# Patient Record
Sex: FEMALE | Race: WHITE | NOT HISPANIC OR LATINO | Employment: OTHER | URBAN - METROPOLITAN AREA
[De-identification: names, ages, dates, MRNs, and addresses within clinical notes are randomized per-mention and may not be internally consistent; named-entity substitution may affect disease eponyms.]

---

## 2022-02-23 ENCOUNTER — TELEPHONE (OUTPATIENT)
Dept: UROLOGY | Facility: MEDICAL CENTER | Age: 87
End: 2022-02-23

## 2022-02-23 NOTE — TELEPHONE ENCOUNTER
Please Triage -   New Patient- Manjhonny Saba    What is the reason for the patients appointment? patient called stating she would like to set up an appointment for blood in urine  Imaging/Lab Results:      Do we accept the patient's insurance or is the patient Self-Pay? Provider & Plan: Bryon Perez Ace Scriver  Member ID#: Has the patient had any previous urologist(s)?  No        Have patient records been requested?in epic        Has the patient had any outside testing done?in [facility]       Does the patient have a personal history of cancer?no       Patient can be reached at :[phone]

## 2022-03-02 ENCOUNTER — HOSPITAL ENCOUNTER (OUTPATIENT)
Dept: RADIOLOGY | Facility: CLINIC | Age: 87
Discharge: HOME | End: 2022-03-02
Payer: MEDICARE

## 2022-03-02 VITALS — BODY MASS INDEX: 21.08 KG/M2 | WEIGHT: 126.5 LBS | HEIGHT: 65 IN

## 2022-03-02 DIAGNOSIS — N64.4 BREAST PAIN: ICD-10-CM

## 2022-03-02 PROCEDURE — 77066 DX MAMMO INCL CAD BI: CPT

## 2022-03-02 PROCEDURE — G0279 TOMOSYNTHESIS, MAMMO: HCPCS

## 2022-03-03 DIAGNOSIS — N64.4 MASTODYNIA: ICD-10-CM

## 2022-04-08 ENCOUNTER — HOSPITAL ENCOUNTER (OUTPATIENT)
Dept: CT IMAGING | Facility: HOSPITAL | Age: 87
Discharge: HOME | End: 2022-04-08
Payer: MEDICARE

## 2022-04-08 DIAGNOSIS — J96.11 CHRONIC RESPIRATORY FAILURE WITH HYPOXIA (HCC): ICD-10-CM

## 2022-04-08 DIAGNOSIS — R93.89 ABNORMAL FINDINGS ON DIAGNOSTIC IMAGING OF OTHER SPECIFIED BODY STRUCTURES: ICD-10-CM

## 2022-04-08 PROCEDURE — G1004 CDSM NDSC: HCPCS

## 2022-04-08 PROCEDURE — 71250 CT THORAX DX C-: CPT

## 2022-05-06 NOTE — TELEPHONE ENCOUNTER
Patient calling to cancel appt today at 6 with Sarah Murdock  Patient had an emergency and will call back to reschedule

## 2022-06-03 NOTE — TELEPHONE ENCOUNTER
NP- has symptoms of UTI since Tuesday  No blood in her urine, but has back pain  No burning, just frequency  I can't find any opening until [place] and she said she can't wait that long       Patient can be reached at [phone]

## 2022-06-03 NOTE — TELEPHONE ENCOUNTER
Called and spoke to pt I reviewed and triaged  Scheduled pt for June 16th at 2:30  Time date location confirmed  Last time pt showed up on the wrong day

## 2022-06-16 ENCOUNTER — OFFICE VISIT (OUTPATIENT)
Dept: UROLOGY | Facility: CLINIC | Age: 87
End: 2022-06-16
Payer: MEDICARE

## 2022-06-16 VITALS
WEIGHT: 122 LBS | DIASTOLIC BLOOD PRESSURE: 82 MMHG | BODY MASS INDEX: 20.3 KG/M2 | SYSTOLIC BLOOD PRESSURE: 122 MMHG | HEART RATE: 72 BPM

## 2022-06-16 DIAGNOSIS — R31.29 MICROSCOPIC HEMATURIA: Primary | ICD-10-CM

## 2022-06-16 DIAGNOSIS — N39.0 RECURRENT UTI: ICD-10-CM

## 2022-06-16 LAB — POST-VOID RESIDUAL VOLUME, ML POC: 42 ML

## 2022-06-16 PROCEDURE — 51798 US URINE CAPACITY MEASURE: CPT

## 2022-06-16 PROCEDURE — 99204 OFFICE O/P NEW MOD 45 MIN: CPT

## 2022-06-16 RX ORDER — ACETAMINOPHEN 500 MG
1 TABLET ORAL DAILY
COMMUNITY

## 2022-06-16 RX ORDER — METOPROLOL SUCCINATE 50 MG/1
50 TABLET, EXTENDED RELEASE ORAL EVERY MORNING
COMMUNITY
Start: 2022-04-25

## 2022-06-16 RX ORDER — ALBUTEROL SULFATE 90 UG/1
AEROSOL, METERED RESPIRATORY (INHALATION)
COMMUNITY
Start: 2022-04-14

## 2022-06-16 RX ORDER — LEVOTHYROXINE SODIUM 100 MCG
TABLET ORAL
COMMUNITY
Start: 2022-03-18

## 2022-06-16 RX ORDER — UMECLIDINIUM BROMIDE AND VILANTEROL TRIFENATATE 62.5; 25 UG/1; UG/1
POWDER RESPIRATORY (INHALATION)
COMMUNITY
Start: 2022-04-04

## 2022-06-16 NOTE — PROGRESS NOTES
6/16/2022    Chief Complaint   Patient presents with    Urinary Tract Infection    Microhematuria       Assessment and Plan    82 y o  female new patient to office    1  Microscopic hematuria  · Urinalysis from 02/17/2022 showed moderate blood negative leukocytes or nitrates  · Unable to review urine culture results from that visit  · She is unable to provide urine sample in the office today  · She does have a positive smoking history, denies gross hematuria  · Repeat urinalysis, culture, urine cytology    2  Recurrent UTIs  · Currently she denies any lower urinary tract symptoms  · She was unable to provide a urine sample in the office today  · Orders placed for urinalysis and culture      Discussion:     I discussed with patient that her symptoms in February were consistent with a possible urinary tract infection  However I am unable to review the urine culture results from that time as they are unavailable through [facility]  Her urinalysis from 02/17 was positive for moderate blood and negative for nitrates or leukocytes  Given her positive smoking history I recommend a repeat urinalysis, micro, and urine cytology to further evaluate  Currently she denies any lower urinary tract symptoms  I discussed the possible need for a cystoscopy or CT renal protocol in the future  She was adamant that she does not want a cystoscopy performed or a CT study with IV contrast   She is willing to undergo a renal ultrasound if necessary  As patient is unable to provide urine sample in office today orders have been placed for her to provide 1 as an outpatient setting  She will follow-up in 4-6 weeks to re-evaluate and discuss  Patient is agreeable to plan and verbalize understanding  History of Present Illness  Zoraida Tavares is a 82 y o  female here for evaluation of microscopic hematuria  She was seen by her PCP on 02/17 for complaints of urinary incontinence and frequency    Urinalysis from 02/17/2022 showed moderate blood, negative nitrates or Estrace  Urine culture is unable to be viewed through epic  She does reports that she was prescribed antibiotics by another provider a few days later which caused her symptoms of urinary incontinence and frequency to resolve  She presents today offering no complaints  She does does report a history of recurrent UTIs in the past   She would occasionally experience some urinary frequency and urgency but this is intermittent and is not overly bothersome to her  She does have a positive smoking history and has been told in the past that her urine always has small amounts of blood in it  She denies any prior cystoscopy or microscopic hematuria workup in the past   Currently she denies any fever, chills, gross hematuria, abdominal pain, or flank pain  Review of Systems   Constitutional: Negative for chills and fever  HENT: Negative for ear pain and sore throat  Eyes: Negative for pain and visual disturbance  Respiratory: Negative for cough and shortness of breath  Cardiovascular: Negative for chest pain and palpitations  Gastrointestinal: Negative for abdominal pain, constipation, diarrhea, nausea and vomiting  Genitourinary: Positive for frequency (intermittent)  Negative for decreased urine volume, difficulty urinating, dysuria, flank pain, hematuria, pelvic pain, urgency and vaginal pain  Musculoskeletal: Negative for arthralgias and back pain  Skin: Negative for color change and rash  Neurological: Negative for dizziness, seizures, syncope, weakness and numbness  All other systems reviewed and are negative  Vitals  Vitals:    06/16/22 1440   BP: 122/82   Pulse: 72   Weight: 55 3 kg (122 lb)       Physical Exam  Vitals reviewed  Constitutional:       General: She is not in acute distress  Appearance: Normal appearance  She is normal weight  She is not ill-appearing or toxic-appearing     HENT:      Head: Normocephalic and atraumatic  Nose: Nose normal    Eyes:      General: No scleral icterus  Conjunctiva/sclera: Conjunctivae normal    Cardiovascular:      Rate and Rhythm: Normal rate  Pulses: Normal pulses  Pulmonary:      Effort: Pulmonary effort is normal  No respiratory distress  Abdominal:      General: Abdomen is flat  Palpations: Abdomen is soft  Tenderness: There is no abdominal tenderness  There is no right CVA tenderness or left CVA tenderness  Hernia: No hernia is present  Musculoskeletal:         General: Normal range of motion  Cervical back: Normal range of motion  Skin:     General: Skin is warm and dry  Neurological:      General: No focal deficit present  Mental Status: She is alert and oriented to person, place, and time  Mental status is at baseline  Psychiatric:         Mood and Affect: Mood normal          Behavior: Behavior normal          Thought Content:  Thought content normal          Judgment: Judgment normal          Past History  Past Medical History:   Diagnosis Date    Hypertension      Social History     Socioeconomic History    Marital status: /Civil Union     Spouse name: None    Number of children: None    Years of education: None    Highest education level: None   Occupational History    None   Tobacco Use    Smoking status: Former Smoker    Smokeless tobacco: Never Used   Vaping Use    Vaping Use: Never used   Substance and Sexual Activity    Alcohol use: Not Currently     Comment: very rare    Drug use: Not Currently    Sexual activity: None   Other Topics Concern    None   Social History Narrative    None     Social Determinants of Health     Financial Resource Strain: Not on file   Food Insecurity: Not on file   Transportation Needs: Not on file   Physical Activity: Not on file   Stress: Not on file   Social Connections: Not on file   Intimate Partner Violence: Not on file   Housing Stability: Not on file     Social History Tobacco Use   Smoking Status Former Smoker   Smokeless Tobacco Never Used     Family History   Problem Relation Age of Onset    Colon cancer Mother     No Known Problems Father     No Known Problems Sister     No Known Problems Maternal Grandmother     No Known Problems Maternal Grandfather     Breast cancer Paternal Grandmother     No Known Problems Paternal Grandfather     Breast cancer Paternal Aunt     Breast cancer Maternal Aunt 61    No Known Problems Maternal Aunt     No Known Problems Paternal Aunt     No Known Problems Paternal Aunt     No Known Problems Paternal Aunt     No Known Problems Son     BRCA2 Positive Neg Hx     BRCA2 Negative Neg Hx     BRCA1 Positive Neg Hx     BRCA1 Negative Neg Hx     BRCA 1/2 Neg Hx     Ovarian cancer Neg Hx     Endometrial cancer Neg Hx     Breast cancer additional onset Neg Hx        The following portions of the patient's history were reviewed and updated as appropriate allergies, current medications, past medical history, past social history, past surgical history and problem list    Imaging:    Results  Recent Results (from the past 1 hour(s))   POCT Measure PVR    Collection Time: 06/16/22  2:50 PM   Result Value Ref Range    POST-VOID RESIDUAL VOLUME, ML POC 42 mL   ]  No results found for: PSA  No results found for: GLUCOSE, CALCIUM, NA, K, CO2, CL, BUN, CREATININE  No results found for: WBC, HGB, HCT, MCV, PLT    Please Note:  Voice dictation software has been used to create this document  There may be inadvertent transcriptions errors       Kiah Polk

## 2022-06-27 ENCOUNTER — LAB (OUTPATIENT)
Dept: LAB | Facility: MEDICAL CENTER | Age: 87
End: 2022-06-27
Payer: MEDICARE

## 2022-06-27 DIAGNOSIS — R31.29 MICROSCOPIC HEMATURIA: ICD-10-CM

## 2022-06-27 DIAGNOSIS — N39.0 RECURRENT UTI: ICD-10-CM

## 2022-06-27 LAB
BACTERIA UR QL AUTO: ABNORMAL /HPF
BILIRUB UR QL STRIP: NEGATIVE
CLARITY UR: CLEAR
COLOR UR: YELLOW
GLUCOSE UR STRIP-MCNC: NEGATIVE MG/DL
HGB UR QL STRIP.AUTO: ABNORMAL
KETONES UR STRIP-MCNC: NEGATIVE MG/DL
LEUKOCYTE ESTERASE UR QL STRIP: ABNORMAL
MUCOUS THREADS UR QL AUTO: ABNORMAL
NITRITE UR QL STRIP: POSITIVE
NON-SQ EPI CELLS URNS QL MICRO: ABNORMAL /HPF
PH UR STRIP.AUTO: 6.5 [PH]
PROT UR STRIP-MCNC: NEGATIVE MG/DL
RBC #/AREA URNS AUTO: ABNORMAL /HPF
SP GR UR STRIP.AUTO: 1.02 (ref 1–1.03)
UROBILINOGEN UR STRIP-ACNC: <2 MG/DL
WBC #/AREA URNS AUTO: ABNORMAL /HPF

## 2022-06-27 PROCEDURE — 87077 CULTURE AEROBIC IDENTIFY: CPT

## 2022-06-27 PROCEDURE — 87186 SC STD MICRODIL/AGAR DIL: CPT

## 2022-06-27 PROCEDURE — 87086 URINE CULTURE/COLONY COUNT: CPT

## 2022-06-27 PROCEDURE — 81001 URINALYSIS AUTO W/SCOPE: CPT

## 2022-06-28 ENCOUNTER — LAB (OUTPATIENT)
Dept: LAB | Facility: MEDICAL CENTER | Age: 87
End: 2022-06-28
Payer: MEDICARE

## 2022-06-28 ENCOUNTER — TELEPHONE (OUTPATIENT)
Dept: UROLOGY | Facility: CLINIC | Age: 87
End: 2022-06-28

## 2022-06-28 DIAGNOSIS — R31.29 MICROSCOPIC HEMATURIA: Primary | ICD-10-CM

## 2022-06-28 DIAGNOSIS — N39.0 RECURRENT UTI: Primary | ICD-10-CM

## 2022-06-28 PROCEDURE — 88112 CYTOPATH CELL ENHANCE TECH: CPT | Performed by: PATHOLOGY

## 2022-06-28 RX ORDER — SULFAMETHOXAZOLE AND TRIMETHOPRIM 800; 160 MG/1; MG/1
1 TABLET ORAL EVERY 12 HOURS SCHEDULED
Qty: 6 TABLET | Refills: 0 | Status: SHIPPED | OUTPATIENT
Start: 2022-06-28 | End: 2022-07-01

## 2022-06-28 NOTE — TELEPHONE ENCOUNTER
Called and made patient aware of urinalysis results and that Bactrim was sent to her pharmacy  She is aware the office will contact her with final urine culture results if she needs to change antibiotics  Patient verbalized understanding

## 2022-06-28 NOTE — RESULT ENCOUNTER NOTE
Please let patient know her urine micro is positive for infection  I have sent a prescription to her pharmacy  Urine culture is pending

## 2022-06-30 LAB
BACTERIA UR CULT: ABNORMAL
BACTERIA UR CULT: ABNORMAL

## 2022-07-28 ENCOUNTER — OFFICE VISIT (OUTPATIENT)
Dept: UROLOGY | Facility: CLINIC | Age: 87
End: 2022-07-28
Payer: MEDICARE

## 2022-07-28 VITALS
WEIGHT: 124.6 LBS | HEART RATE: 70 BPM | BODY MASS INDEX: 20.73 KG/M2 | SYSTOLIC BLOOD PRESSURE: 126 MMHG | DIASTOLIC BLOOD PRESSURE: 80 MMHG

## 2022-07-28 DIAGNOSIS — N39.0 RECURRENT UTI: ICD-10-CM

## 2022-07-28 DIAGNOSIS — R31.29 MICROSCOPIC HEMATURIA: Primary | ICD-10-CM

## 2022-07-28 PROCEDURE — 99213 OFFICE O/P EST LOW 20 MIN: CPT

## 2022-07-28 NOTE — PROGRESS NOTES
7/28/2022    No chief complaint on file  Assessment and Plan    82 y o  female     1  Microscopic hematuria  · Likely in the setting of infection   · Urine culture positive on 6/30  · >100,000 cfu/mL Escherichia coli   · 40,000-49,000 cfu/ml Proteus vulgaris  · Urine cytology on 6/28 was negative for any high grade urothelial cells  · Denies episodes of gross hematuria  · Recommend follow-up in 6 months with repeat urine testing  2  Recurrent UTI  · Last positive urine culture 6/30  · >100,000 cfu/mL Escherichia coli   · 40,000-49,000 cfu/ml Proteus vulgaris  · Denies lower urinary tract symptoms today  · Recommend daily oral cranberry supplements and D-mannose  · Follow-up in 6 months      History of Present Illness  Racquel Petersen is a 82 y o  female here for follow-up evaluation of recurrent UTIs and microscopic hematuria initially seen by me on 6/16/2022 after being referred by her PCP  She was seen by her PCP on 02/17 for complaints of urinary incontinence and frequency  Urinalysis from 02/17/2022 showed moderate blood, negative nitrates or Estrace  Urine culture is unable to be viewed through epic  She does reports that she was prescribed antibiotics by another provider a few days later which caused her symptoms of urinary incontinence and frequency to resolve  During her last office visit she did reports a history of recurrent UTIs in the past  However given her smoking history I discussed possible need for further evaluation with a Renal US and repeat urine testing  Review of urinalysis from 6/27 showed large leukocytes, positive nitrates, and small blood  Urine culture was positive for >100,000 cfu/mL Escherichia coli and 40,000-49,000 cfu/ml Proteus vulgaris  She was treated with Bactrim at that time  Urine cytology from 6/28 was negative for high grade urothelial cells  She presents today offering no complaints   She denies any lower urinary tract symptoms, fever, chills, gross hematuria, abdominal pain, or flank pain  Review of Systems   Constitutional: Negative for chills and fever  HENT: Negative for ear pain and sore throat  Eyes: Negative for pain and visual disturbance  Respiratory: Negative for cough and shortness of breath  Cardiovascular: Negative for chest pain and palpitations  Gastrointestinal: Negative for abdominal pain, diarrhea, nausea and vomiting  Genitourinary: Negative for difficulty urinating, dysuria, flank pain, frequency, hematuria and urgency  Musculoskeletal: Negative for arthralgias and back pain  Skin: Negative for color change and rash  Neurological: Negative for dizziness, seizures, syncope and weakness  All other systems reviewed and are negative  Vitals  Vitals:    07/28/22 1426   BP: 126/80   Pulse: 70   Weight: 56 5 kg (124 lb 9 6 oz)       Physical Exam  Vitals reviewed  Constitutional:       General: She is not in acute distress  Appearance: Normal appearance  She is normal weight  She is not ill-appearing or toxic-appearing  HENT:      Head: Normocephalic and atraumatic  Nose: Nose normal    Eyes:      General: No scleral icterus  Conjunctiva/sclera: Conjunctivae normal    Cardiovascular:      Rate and Rhythm: Normal rate  Pulses: Normal pulses  Pulmonary:      Effort: Pulmonary effort is normal  No respiratory distress  Abdominal:      General: Abdomen is flat  Palpations: Abdomen is soft  Tenderness: There is no abdominal tenderness  There is no right CVA tenderness or left CVA tenderness  Hernia: No hernia is present  Musculoskeletal:         General: Normal range of motion  Cervical back: Normal range of motion  Skin:     General: Skin is warm and dry  Neurological:      General: No focal deficit present  Mental Status: She is alert and oriented to person, place, and time  Mental status is at baseline     Psychiatric:         Mood and Affect: Mood normal          Behavior: Behavior normal          Thought Content:  Thought content normal          Judgment: Judgment normal          Past History  Past Medical History:   Diagnosis Date    Hypertension      Social History     Socioeconomic History    Marital status: /Civil Union     Spouse name: None    Number of children: None    Years of education: None    Highest education level: None   Occupational History    None   Tobacco Use    Smoking status: Former Smoker    Smokeless tobacco: Never Used   Vaping Use    Vaping Use: Never used   Substance and Sexual Activity    Alcohol use: Not Currently     Comment: very rare    Drug use: Not Currently    Sexual activity: None   Other Topics Concern    None   Social History Narrative    None     Social Determinants of Health     Financial Resource Strain: Not on file   Food Insecurity: Not on file   Transportation Needs: Not on file   Physical Activity: Not on file   Stress: Not on file   Social Connections: Not on file   Intimate Partner Violence: Not on file   Housing Stability: Not on file     Social History     Tobacco Use   Smoking Status Former Smoker   Smokeless Tobacco Never Used     Family History   Problem Relation Age of Onset    Colon cancer Mother     No Known Problems Father     No Known Problems Sister     No Known Problems Maternal Grandmother     No Known Problems Maternal Grandfather     Breast cancer Paternal Grandmother     No Known Problems Paternal Grandfather     Breast cancer Paternal Aunt     Breast cancer Maternal Aunt 61    No Known Problems Maternal Aunt     No Known Problems Paternal Aunt     No Known Problems Paternal Aunt     No Known Problems Paternal Aunt     No Known Problems Son     BRCA2 Positive Neg Hx     BRCA2 Negative Neg Hx     BRCA1 Positive Neg Hx     BRCA1 Negative Neg Hx     BRCA 1/2 Neg Hx     Ovarian cancer Neg Hx     Endometrial cancer Neg Hx     Breast cancer additional onset Neg Hx        The following portions of the patient's history were reviewed and updated as appropriate allergies, current medications, past medical history, past social history, past surgical history and problem list    Imaging:    Results  No results found for this or any previous visit (from the past 1 hour(s))  ]  No results found for: PSA  No results found for: GLUCOSE, CALCIUM, NA, K, CO2, CL, BUN, CREATININE  No results found for: WBC, HGB, HCT, MCV, PLT    Please Note:  Voice dictation software has been used to create this document  There may be inadvertent transcriptions errors       Yaquelin Jean

## 2022-08-11 ENCOUNTER — HOSPITAL ENCOUNTER (OUTPATIENT)
Dept: CT IMAGING | Facility: HOSPITAL | Age: 87
Discharge: HOME | End: 2022-08-11
Payer: MEDICARE

## 2022-08-11 DIAGNOSIS — J44.9 COPD (CHRONIC OBSTRUCTIVE PULMONARY DISEASE) (HCC): ICD-10-CM

## 2022-08-11 PROCEDURE — G1004 CDSM NDSC: HCPCS

## 2022-08-11 PROCEDURE — 71250 CT THORAX DX C-: CPT

## 2023-01-30 ENCOUNTER — OFFICE VISIT (OUTPATIENT)
Dept: UROLOGY | Facility: CLINIC | Age: 88
End: 2023-01-30

## 2023-01-30 VITALS
SYSTOLIC BLOOD PRESSURE: 128 MMHG | DIASTOLIC BLOOD PRESSURE: 80 MMHG | BODY MASS INDEX: 20.63 KG/M2 | WEIGHT: 124 LBS | HEART RATE: 70 BPM

## 2023-01-30 DIAGNOSIS — R31.29 MICROSCOPIC HEMATURIA: Primary | ICD-10-CM

## 2023-01-30 DIAGNOSIS — N39.0 RECURRENT UTI: ICD-10-CM

## 2023-01-30 LAB
BACTERIA UR QL AUTO: NORMAL /HPF
BILIRUB UR QL STRIP: NEGATIVE
CLARITY UR: CLEAR
COLOR UR: NORMAL
GLUCOSE UR STRIP-MCNC: NEGATIVE MG/DL
HGB UR QL STRIP.AUTO: NEGATIVE
KETONES UR STRIP-MCNC: NEGATIVE MG/DL
LEUKOCYTE ESTERASE UR QL STRIP: NEGATIVE
NITRITE UR QL STRIP: NEGATIVE
NON-SQ EPI CELLS URNS QL MICRO: NORMAL /HPF
PH UR STRIP.AUTO: 6 [PH]
PROT UR STRIP-MCNC: NEGATIVE MG/DL
RBC #/AREA URNS AUTO: NORMAL /HPF
SP GR UR STRIP.AUTO: 1.01 (ref 1–1.03)
UROBILINOGEN UR STRIP-ACNC: <2 MG/DL
WBC #/AREA URNS AUTO: NORMAL /HPF

## 2023-01-30 RX ORDER — TIOTROPIUM BROMIDE 18 UG/1
CAPSULE ORAL; RESPIRATORY (INHALATION)
COMMUNITY
Start: 2022-12-08

## 2023-01-30 RX ORDER — LATANOPROST 50 UG/ML
SOLUTION/ DROPS OPHTHALMIC
COMMUNITY
Start: 2023-01-18

## 2023-01-30 RX ORDER — PANTOPRAZOLE SODIUM 20 MG/1
20 TABLET, DELAYED RELEASE ORAL EVERY MORNING
COMMUNITY
Start: 2022-11-28

## 2023-01-30 NOTE — PROGRESS NOTES
1/30/2023    No chief complaint on file  Assessment and Plan    82 y o  female     1  Microscopic hematuria  · Denies any episodes of gross hematuria  Microscopic hematuria is likely in the setting of Chronic cystitis   · Urine dip again shows trace blood today  · We will send for urine micro and culture  · If positive for true microscopic hematuria without infection I would recommend a Renal US and Cystoscopy  She is agreeable to have a Renal US done but is refusing a cystoscopy  · Follow-up 1 year pending urine testing results  2  Recurrent UTIs  · Doing very well since addition of oral cranberry and increasing water intake  · Denies any recurrent UTIs since last office visit  · Follow-up in 1 year or sooner as needed  Subjective:    She presents today reporting doing very well  She denies any issues with recurrent UTIs since her last office visit  She has been taking oral cranberry daily and increasing her water intake  She denies any gross hematuria  History of Present Illness  Orestes Ríos is a 82 y o  female here for 6-month follow-up evaluation of microscopic hematuria and recurrent UTIs  She was initially seen by me on 6/16/2022 after being referred by her PCP for evaluation of microscopic hematuria  Urinalysis from 2/17/2022 showed moderate blood, negative nitrates or esterase  During that time she did complain of urinary incontinence and frequency, and had been prescribed antibiotics by a different provider a few days after being seen by her PCP and reported resolution of her incontinence and frequency  She was unable to provide a urine sample during her initial office visit so urinalysis, culture and cytology were ordered for follow-up  She was seen in follow-up on 7/28/2022, and review of urinalysis from 6/27 showed large leukocytes, positive nitrates, and small blood   Urine culture was positive for >100,000 cfu/mL Escherichia coli and 40,000-49,000 cfu/ml Proteus vulgaris  She was treated with Bactrim at that time  Urine cytology from 6/28 was negative for high grade urothelial cells  She was started on oral cranberry supplements and d-mannose for UTI prevention and instructed to follow-up in 6 months  Review of Systems   Constitutional: Negative for chills and fever  HENT: Negative for ear pain and sore throat  Eyes: Negative for pain and visual disturbance  Respiratory: Negative for cough and shortness of breath  Cardiovascular: Negative for chest pain and palpitations  Gastrointestinal: Negative for abdominal pain, constipation, diarrhea, nausea and vomiting  Genitourinary: Negative for decreased urine volume, difficulty urinating, dysuria, flank pain, frequency, hematuria and urgency  Musculoskeletal: Negative for arthralgias and back pain  Skin: Negative for color change and rash  Neurological: Negative for dizziness, syncope, weakness and numbness  All other systems reviewed and are negative  Vitals  Vitals:    01/30/23 1142   BP: 128/80   Pulse: 70   Weight: 56 2 kg (124 lb)       Physical Exam  Vitals reviewed  Constitutional:       General: She is not in acute distress  Appearance: Normal appearance  She is normal weight  She is not ill-appearing or toxic-appearing  HENT:      Head: Normocephalic and atraumatic  Nose: Nose normal    Eyes:      General: No scleral icterus  Conjunctiva/sclera: Conjunctivae normal    Cardiovascular:      Rate and Rhythm: Normal rate  Pulses: Normal pulses  Pulmonary:      Effort: Pulmonary effort is normal  No respiratory distress  Abdominal:      General: Abdomen is flat  Palpations: Abdomen is soft  Tenderness: There is no abdominal tenderness  There is no right CVA tenderness or left CVA tenderness  Hernia: No hernia is present  Musculoskeletal:         General: Normal range of motion        Cervical back: Normal range of motion  Skin:     General: Skin is warm and dry  Neurological:      General: No focal deficit present  Mental Status: She is alert and oriented to person, place, and time  Mental status is at baseline  Psychiatric:         Mood and Affect: Mood normal          Behavior: Behavior normal          Thought Content:  Thought content normal          Judgment: Judgment normal          Past History  Past Medical History:   Diagnosis Date   • Hypertension      Social History     Socioeconomic History   • Marital status: /Civil Union     Spouse name: None   • Number of children: None   • Years of education: None   • Highest education level: None   Occupational History   • None   Tobacco Use   • Smoking status: Former   • Smokeless tobacco: Never   Vaping Use   • Vaping Use: Never used   Substance and Sexual Activity   • Alcohol use: Not Currently     Comment: very rare   • Drug use: Not Currently   • Sexual activity: None   Other Topics Concern   • None   Social History Narrative   • None     Social Determinants of Health     Financial Resource Strain: Not on file   Food Insecurity: Not on file   Transportation Needs: Not on file   Physical Activity: Not on file   Stress: Not on file   Social Connections: Not on file   Intimate Partner Violence: Not on file   Housing Stability: Not on file     Social History     Tobacco Use   Smoking Status Former   Smokeless Tobacco Never     Family History   Problem Relation Age of Onset   • Colon cancer Mother    • No Known Problems Father    • No Known Problems Sister    • No Known Problems Maternal Grandmother    • No Known Problems Maternal Grandfather    • Breast cancer Paternal Grandmother    • No Known Problems Paternal Grandfather    • Breast cancer Paternal Aunt    • Breast cancer Maternal Aunt 60   • No Known Problems Maternal Aunt    • No Known Problems Paternal Aunt    • No Known Problems Paternal Aunt    • No Known Problems Paternal Aunt    • No Known Problems Son    • BRCA2 Positive Neg Hx    • BRCA2 Negative Neg Hx    • BRCA1 Positive Neg Hx    • BRCA1 Negative Neg Hx    • BRCA 1/2 Neg Hx    • Ovarian cancer Neg Hx    • Endometrial cancer Neg Hx    • Breast cancer additional onset Neg Hx        The following portions of the patient's history were reviewed and updated as appropriate allergies, current medications, past medical history, past social history, past surgical history and problem list    Imaging:    Results  No results found for this or any previous visit (from the past 1 hour(s))  ]  No results found for: PSA  No results found for: GLUCOSE, CALCIUM, NA, K, CO2, CL, BUN, CREATININE  No results found for: WBC, HGB, HCT, MCV, PLT    Please Note:  Voice dictation software has been used to create this document  There may be inadvertent transcriptions errors       Ness Browne

## 2023-01-31 LAB
BACTERIA UR CULT: ABNORMAL
BACTERIA UR CULT: ABNORMAL

## 2023-02-17 ENCOUNTER — HOSPITAL ENCOUNTER (OUTPATIENT)
Dept: CT IMAGING | Facility: HOSPITAL | Age: 88
End: 2023-02-17

## 2023-02-17 DIAGNOSIS — R91.1 PULMONARY NODULE: ICD-10-CM

## 2023-02-27 ENCOUNTER — NURSE TRIAGE (OUTPATIENT)
Dept: OTHER | Facility: OTHER | Age: 88
End: 2023-02-27

## 2023-02-27 DIAGNOSIS — N39.0 RECURRENT UTI: Primary | ICD-10-CM

## 2023-02-27 NOTE — TELEPHONE ENCOUNTER
Regarding: UTI symptoms  ----- Message from Margarita Perez RN sent at 2/27/2023 12:15 PM EST -----  "They sent by urine out on 1/30/23 but I never heard anything back from them     I have had a UTI for the past 3 days, I dont feel like I have to go but it just comes out"

## 2023-02-27 NOTE — TELEPHONE ENCOUNTER
Reason for Disposition  • All other urine symptoms    Answer Assessment - Initial Assessment Questions  SYMPTOM: "What's the main symptom you're concerned about?" (e g , frequency, incontinence)      UTI sx   ONSET: "When did the UTI sx  start?"     3 days ago     CAUSE: "What do you think is causing the symptoms?"      UTI    Protocols used: URINARY SYMPTOMS-ADULT-[place]

## 2023-02-27 NOTE — TELEPHONE ENCOUNTER
Regarding: missed nurse's call  ----- Message from Wander Reynoso sent at 2/27/2023  4:39 PM EST -----  " I went to  my car and missed the call from the nurse   Can you please let her know that I am back home and to please call me to let me know what to do "

## 2023-02-27 NOTE — TELEPHONE ENCOUNTER
If she develops any fevers or chills I recommend she go to the hospital for evaluation  Is there someone that can take the sample to a lab for her?   But I agree with urine testing which should be done sooner than later

## 2023-02-27 NOTE — TELEPHONE ENCOUNTER
Pt had a urine cx done on 1/30/23  pt was asymptomatic at that time and no abx was ordered  Pt is now symptomatic with urinary burning and urinary frequency/incontinence  [facility] confirmed  Reason for Disposition  • Urinating more frequently than usual (i e , frequency)    Answer Assessment - Initial Assessment Questions  1  SYMPTOM: "What's the main symptom you're concerned about?" (e g , frequency, incontinence)        2  ONSET: "When did the    start?"     3 days  3  PAIN: "Is there any pain?" If Yes, ask: "How bad is it?" (Scale: 1-10; mild, moderate, severe)      denies  4  CAUSE: "What do you think is causing the symptoms?"     uti  5   OTHER SYMPTOMS: "Do you have any other symptoms?" (e g , fever, flank pain, blood in urine, pain with urination)   burning with urination, urinary frequency    Protocols used: URINARY SYMPTOMS-ADULT-OH

## 2023-02-27 NOTE — TELEPHONE ENCOUNTER
Patient states she missed call from urology RN  Patient made aware order for UA/culture is placed  She states she will go get the testing done tomorrow depending on the weather  Told to call back with worsening sx as she may need to be seen sooner  Patient in agreement

## 2023-03-02 NOTE — TELEPHONE ENCOUNTER
Called and left voicemail for patient to follow up on her symptoms, as urine testing has not yet been completed

## 2023-03-08 NOTE — TELEPHONE ENCOUNTER
Returned call to patient   Last seen in our office on 1/30/23 for microscopic Hematuria  Patient states she after appt she was not prescribed any antibiotics and has been feeling progressively worse  reports chills, does not have temp,   lower back pain b/l , kidneys and stomach intermittent   Feels unsteady started on Saturday  She was not able to get the blood work which was ordered , due to the weather  Advised patient due to reported symptoms and last recommendation per provider with worsening of symptoms to go to ed for evaluations  Patient verbalized understanding and agreement

## 2023-03-08 NOTE — TELEPHONE ENCOUNTER
Patient calling  She was not able to get to the lab last week to do the urine testing  patient is complaining of chills, no fever,  kidney pain on both sides it alternates sides, patient feels shakey  Please advise    [phone]

## 2023-03-08 NOTE — TELEPHONE ENCOUNTER
Agree with plan above  I reviewed urine cultures following her last office visit these were consistent with contaminants and did not require antibiotic treatment

## 2023-06-14 ENCOUNTER — HOSPITAL ENCOUNTER (OUTPATIENT)
Dept: RADIOLOGY | Facility: CLINIC | Age: 88
Discharge: HOME | End: 2023-06-14
Payer: MEDICARE

## 2023-06-14 VITALS — HEIGHT: 65 IN | BODY MASS INDEX: 20.66 KG/M2 | WEIGHT: 124 LBS

## 2023-06-14 DIAGNOSIS — Z12.31 ENCOUNTER FOR SCREENING MAMMOGRAM FOR MALIGNANT NEOPLASM OF BREAST: ICD-10-CM

## 2023-06-14 PROCEDURE — 77063 BREAST TOMOSYNTHESIS BI: CPT

## 2023-06-14 PROCEDURE — 77067 SCR MAMMO BI INCL CAD: CPT

## 2023-10-20 RX ORDER — TRIAMCINOLONE ACETONIDE 1 MG/G
CREAM TOPICAL
COMMUNITY
Start: 2023-05-31 | End: 2023-10-23 | Stop reason: ALTCHOICE

## 2023-10-23 ENCOUNTER — OFFICE VISIT (OUTPATIENT)
Dept: FAMILY MEDICINE CLINIC | Facility: CLINIC | Age: 88
End: 2023-10-23
Payer: MEDICARE

## 2023-10-23 VITALS
WEIGHT: 126 LBS | BODY MASS INDEX: 20.99 KG/M2 | HEART RATE: 97 BPM | DIASTOLIC BLOOD PRESSURE: 72 MMHG | SYSTOLIC BLOOD PRESSURE: 134 MMHG | OXYGEN SATURATION: 94 % | HEIGHT: 65 IN

## 2023-10-23 DIAGNOSIS — E43 SEVERE PROTEIN-CALORIE MALNUTRITION (HCC): ICD-10-CM

## 2023-10-23 DIAGNOSIS — J43.2 CENTRILOBULAR EMPHYSEMA (HCC): ICD-10-CM

## 2023-10-23 DIAGNOSIS — H25.89 OTHER AGE-RELATED CATARACT OF BOTH EYES: ICD-10-CM

## 2023-10-23 DIAGNOSIS — J96.11 CHRONIC RESPIRATORY FAILURE WITH HYPOXIA (HCC): ICD-10-CM

## 2023-10-23 DIAGNOSIS — E03.9 HYPOTHYROIDISM, UNSPECIFIED TYPE: Primary | ICD-10-CM

## 2023-10-23 PROCEDURE — 99214 OFFICE O/P EST MOD 30 MIN: CPT | Performed by: STUDENT IN AN ORGANIZED HEALTH CARE EDUCATION/TRAINING PROGRAM

## 2023-10-23 RX ORDER — BUDESONIDE AND FORMOTEROL FUMARATE DIHYDRATE 160; 4.5 UG/1; UG/1
2 AEROSOL RESPIRATORY (INHALATION) 2 TIMES DAILY
Qty: 10.2 G | Refills: 5 | Status: SHIPPED | OUTPATIENT
Start: 2023-10-23

## 2023-10-23 NOTE — PROGRESS NOTES
Name: Tiffanie Morales      : 1935      MRN: [id number]  Encounter Provider: Yared Prakash MD  Encounter Date: 10/23/2023   Encounter department: [address] [place] [address] PRIMARY CARE    Assessment & Plan     1. Hypothyroidism, unspecified type  Comments:  TSH . 28  currently on synth 100mcg  will get repeat  Orders:  -     TSH + Free T4; Future    2. Centrilobular emphysema (HCC)  Comments:  hx of 60pack years  currently 5-10 cig  endorses morning cough  only taking prn Albuterol   stop taking spiriva due to Thena Ryan  tee try symbicort  Orders:  -     Ambulatory Referral to Pulmonology; Future  -     budesonide-formoterol (SYMBICORT) 160-4.5 mcg/act inhaler; Inhale 2 puffs 2 (two) times a day Rinse mouth after use. 3. Other age-related cataract of both eyes  -     Ambulatory Referral to Ophthalmology; Future    4. Severe protein-calorie malnutrition ([address])    5. Chronic respiratory failure with hypoxia (HCC)        Depression Screening and Follow-up Plan: Patient was screened for depression during today's encounter. They screened negative with a PHQ-2 score of 0. Subjective     HPI    Pt is a 83 y/o F who presents to [facility] care. Pt has been doing well overall. Sees pulmonology in [place] but will like a provider nearby. She also has cataracts which requires surgery. She takes albuterol daily and did not take Spiriva or Anorro due to hx of glaucoma. She does have EDDY but denies any wheezing. Denies any HA,chest pain, abdominal pain, nausea, vomiting, fever or chills. PAST MEDICAL HISTORY:   Chronic obstructive pulmonary disease  Gastroesophageal reflux   Hiatal hernia  Hypothyroidism  Hypertension  Diastolic dysfunction    PAST SURGICAL HISTORY:  Total knee replacement R sided  Cholecystectomy, gallbladder  Thyroidectomy ()  Hysterectomy - complicated by hemorrhage       Review of Systems   Constitutional:  Negative for chills, fatigue and unexpected weight change.    HENT: Negative for congestion. Eyes:  Positive for visual disturbance. Respiratory:  Positive for cough. Negative for chest tightness, shortness of breath and wheezing. Cardiovascular:  Negative for chest pain, palpitations and leg swelling. Endocrine: Negative for polydipsia and polyuria. Genitourinary:  Negative for dysuria. Neurological:  Negative for weakness.        Past Medical History:   Diagnosis Date    Cataract     COPD (chronic obstructive pulmonary disease) ([address])     Hypertension      Past Surgical History:   Procedure Laterality Date    HYSTERECTOMY  06/1972    OOPHORECTOMY Left 06/1972     Family History   Problem Relation Age of Onset    Colon cancer Mother     No Known Problems Father     No Known Problems Sister     No Known Problems Maternal Grandmother     No Known Problems Maternal Grandfather     Breast cancer Paternal Grandmother         unknown age    No Known Problems Paternal Grandfather     No Known Problems Son     Breast cancer Maternal Aunt 60    No Known Problems Maternal Aunt     Breast cancer Paternal Aunt         unknown age    No Known Problems Paternal Aunt     No Known Problems Paternal Aunt     No Known Problems Paternal Aunt     BRCA2 Positive Neg Hx     BRCA2 Negative Neg Hx     BRCA1 Positive Neg Hx     BRCA1 Negative Neg Hx     BRCA 1/2 Neg Hx     Ovarian cancer Neg Hx     Endometrial cancer Neg Hx     Breast cancer additional onset Neg Hx      Social History     Socioeconomic History    Marital status: /Civil Union     Spouse name: None    Number of children: None    Years of education: None    Highest education level: None   Occupational History    None   Tobacco Use    Smoking status: Former    Smokeless tobacco: Never   Vaping Use    Vaping Use: Never used   Substance and Sexual Activity    Alcohol use: Not Currently     Comment: very rare    Drug use: Not Currently    Sexual activity: None   Other Topics Concern    None   Social History Narrative    None Social Determinants of Health     Financial Resource Strain: Not on file   Food Insecurity: Not on file   Transportation Needs: Not on file   Physical Activity: Not on file   Stress: Not on file   Social Connections: Not on file   Intimate Partner Violence: Not on file   Housing Stability: Not on file     Current Outpatient Medications on File Prior to Visit   Medication Sig    albuterol (PROVENTIL HFA,VENTOLIN HFA) 90 mcg/act inhaler inhale 2 puffs by mouth and INTO THE LUNGS every 4 hours    Calcium Carbonate-Vit D-Min (Calcium 1200) 6606-4474 MG-UNIT CHEW Chew 1 tablet daily    metoprolol succinate (TOPROL-XL) 50 mg 24 hr tablet Take 50 mg by mouth every morning    Synthroid 100 MCG tablet TAKE 1 TABLET BY MOUTH IN THE MORNING 30-60 MINUTES BEFORE EATING    Anoro Ellipta 62.5-25 MCG/INH inhaler inhale 1 puff by mouth and INTO THE LUNGS every morning (Patient not taking: Reported on 10/23/2023)    latanoprost (XALATAN) 0.005 % ophthalmic solution place 1 drop into both eyes every evening    [DISCONTINUED] pantoprazole (PROTONIX) 20 mg tablet Take 20 mg by mouth every morning (Patient not taking: Reported on 10/23/2023)    [DISCONTINUED] Spiriva HandiHaler 18 MCG inhalation capsule INHALE 1 CAPSULE ( 2 PUFFS ) BY MOUTH IN THE MORNING * DO NOT SWALLOW CAPSULE (Patient not taking: Reported on 10/23/2023)    [DISCONTINUED] triamcinolone (KENALOG) 0.1 % cream Apply to affected areas 2 times daily (Patient not taking: Reported on 10/23/2023)     No Known Allergies    There is no immunization history on file for this patient. Objective     /72   Pulse 97   Ht 5' 5" (1.651 m)   Wt 57.2 kg (126 lb)   SpO2 94%   BMI 20.97 kg/m²     Physical Exam  Constitutional:       General: She is not in acute distress. Appearance: She is not ill-appearing, toxic-appearing or diaphoretic. HENT:      Head: Normocephalic and atraumatic. Cardiovascular:      Rate and Rhythm: Normal rate and regular rhythm. Pulses: Normal pulses. Heart sounds: Normal heart sounds. No murmur heard. No friction rub. No gallop. Pulmonary:      Effort: Pulmonary effort is normal.      Breath sounds: Normal breath sounds. No wheezing, rhonchi or rales. Chest:      Chest wall: No tenderness. Abdominal:      General: There is no distension. Palpations: There is no mass. Tenderness: There is no abdominal tenderness. There is no right CVA tenderness, left CVA tenderness, guarding or rebound. Hernia: No hernia is present. Musculoskeletal:      Right lower leg: No edema. Left lower leg: No edema. Skin:     Findings: No lesion.    Neurological:      Gait: Gait normal.      Deep Tendon Reflexes: Reflexes normal.   Psychiatric:         Mood and Affect: Mood normal.         Behavior: Behavior normal.       Larissa Franco MD

## 2023-10-24 ENCOUNTER — APPOINTMENT (OUTPATIENT)
Dept: LAB | Facility: CLINIC | Age: 88
End: 2023-10-24
Payer: MEDICARE

## 2023-10-24 ENCOUNTER — TELEPHONE (OUTPATIENT)
Dept: ADMINISTRATIVE | Facility: OTHER | Age: 88
End: 2023-10-24

## 2023-10-24 DIAGNOSIS — E03.9 HYPOTHYROIDISM, UNSPECIFIED TYPE: ICD-10-CM

## 2023-10-24 LAB
T4 FREE SERPL-MCNC: 1.15 NG/DL (ref 0.61–1.12)
TSH SERPL DL<=0.05 MIU/L-ACNC: 1.11 UIU/ML (ref 0.45–4.5)

## 2023-10-24 PROCEDURE — 84443 ASSAY THYROID STIM HORMONE: CPT

## 2023-10-24 PROCEDURE — 36415 COLL VENOUS BLD VENIPUNCTURE: CPT

## 2023-10-24 PROCEDURE — 84439 ASSAY OF FREE THYROXINE: CPT

## 2023-10-24 NOTE — TELEPHONE ENCOUNTER
----- Message from Delmi Noel sent at 10/20/2023 12:48 PM EDT -----  Regarding: Care gap request  10/20/23 12:48 PM    Hello, our patient No patient name on file. has had Mammogram completed/performed. Please assist in updating the patient chart by pulling the Care Everywhere (CE) document. The date of service is 06/13/2023.      Thank you,  Delmi Noel   [address] [facility]

## 2023-10-24 NOTE — TELEPHONE ENCOUNTER
----- Message from Jim Rotmhan sent at 10/20/2023 12:50 PM EDT -----  Regarding: Care gap request  10/20/23 12:50 PM    Hello, our patient No patient name on file. has had DEXA Scan completed/performed. Please assist in updating the patient chart by pulling the Care Everywhere (CE) document. The date of service is 03/17/2021.      Thank you,  Jim Rothman   [address] [facility]

## 2023-10-25 NOTE — TELEPHONE ENCOUNTER
Upon review of the In Basket request we were able to locate, review, and update the patient chart as requested for DEXA Scan and Mammogram.    Any additional questions or concerns should be emailed to the Practice Liaisons via the appropriate education email address, please do not reply via In Basket.     Thank you  Prashant Martinez

## 2023-10-26 DIAGNOSIS — E03.9 HYPOTHYROIDISM, UNSPECIFIED TYPE: Primary | ICD-10-CM

## 2023-10-26 RX ORDER — LEVOTHYROXINE SODIUM 0.05 MG/1
50 TABLET ORAL
Qty: 90 TABLET | Refills: 3 | Status: SHIPPED | OUTPATIENT
Start: 2023-10-26

## 2023-11-01 ENCOUNTER — TELEPHONE (OUTPATIENT)
Dept: PULMONOLOGY | Facility: CLINIC | Age: 88
End: 2023-11-01

## 2023-11-01 NOTE — TELEPHONE ENCOUNTER
Callled and left message on patients machine to contact office as she was just seen by [facility] 9/14 and not sure if patient still needs this apt.

## 2023-11-07 ENCOUNTER — CONSULT (OUTPATIENT)
Dept: PULMONOLOGY | Facility: CLINIC | Age: 88
End: 2023-11-07

## 2023-11-07 VITALS
HEIGHT: 65 IN | DIASTOLIC BLOOD PRESSURE: 68 MMHG | SYSTOLIC BLOOD PRESSURE: 120 MMHG | OXYGEN SATURATION: 90 % | WEIGHT: 125 LBS | TEMPERATURE: 97.2 F | HEART RATE: 70 BPM | BODY MASS INDEX: 20.83 KG/M2

## 2023-11-07 DIAGNOSIS — J44.9 COPD, SEVERE (HCC): Primary | ICD-10-CM

## 2023-11-07 DIAGNOSIS — J43.2 CENTRILOBULAR EMPHYSEMA (HCC): ICD-10-CM

## 2023-11-07 DIAGNOSIS — R91.8 PULMONARY NODULES: ICD-10-CM

## 2023-11-07 DIAGNOSIS — J42 CHRONIC BRONCHITIS, UNSPECIFIED CHRONIC BRONCHITIS TYPE (HCC): ICD-10-CM

## 2023-11-07 RX ORDER — ALBUTEROL SULFATE 2.5 MG/3ML
2.5 SOLUTION RESPIRATORY (INHALATION) 2 TIMES DAILY
Qty: 180 ML | Refills: 6 | Status: SHIPPED | OUTPATIENT
Start: 2023-11-07 | End: 2023-12-07

## 2023-11-07 RX ORDER — ALBUTEROL SULFATE 2.5 MG/3ML
2.5 SOLUTION RESPIRATORY (INHALATION) EVERY 6 HOURS PRN
Qty: 180 ML | Refills: 6 | Status: SHIPPED | OUTPATIENT
Start: 2023-11-07 | End: 2023-11-07

## 2023-11-07 RX ORDER — SODIUM CHLORIDE FOR INHALATION 3 %
4 VIAL, NEBULIZER (ML) INHALATION 2 TIMES DAILY
Qty: 240 ML | Refills: 5 | Status: SHIPPED | OUTPATIENT
Start: 2023-11-07 | End: 2023-12-07

## 2023-11-07 NOTE — PROGRESS NOTES
Pulmonary Consultation   Felicitas Samson 82 y.o. female MRN: [id number]  11/7/2023      Assessment:  Severe COPD  Gold stage IIId last FEV1 of 36% in 2022  Seemed chronic bronchitis phenotype/moist cough noted in the office today  Minimal/clear sputum production  No history of frequent exacerbation or PRN use  Not on maintenance inhalers currently  Intermittent/infrequent wheezing in the day in addition to chest congestion    Plan:  Counseled about need for maintenance therapy, given the history of glaucoma does not want any drug interaction with the  Would avoid LAMA/Lea, discontinued Anoro Ellipta  Already has a prescription for the Symbicort, advised to use 2 puffs every 12 educated about the proper inhaler use technique  Added mucus clearing therapy: Hypertonic saline nebs/ordered a nebulizer with the supplies  Albuterol nebs at least twice daily    Active tobacco abuse  At least 60-pack-year, smokes 1 to 2 cigarettes intermittently  Counseled extensively    Abnormal CT chest  Per last imaging in 2/2023, seems to have progression/consolidation at the RLL, with more secretions around the same area suggesting pneumonitis/chronic bronchitis or mucus production  Considered a high risk given the 60-pack-year tobacco abuse history  Given the waxing and waning compared to prior imaging in 2019, with ongoing cough, currently not on treatment for chronic bronchitis    Plan:  Enhance mucus clearing: Albuterol/hypertonic saline nebs twice daily for at least 4 weeks  We will check CT chest in 4 weeks  Based on the residual findings we will discuss about PET/CT/vs biopsies if there is a suspicious lesions or bronchoscopy for airway exam    Return in about 6 weeks (around 12/19/2023).         History of Present Illness     New Consult for: COPD/abnormal CT chest       Background  82 y.o. female with a h/o hypothyroidism, malnutrition, COPD, tobacco abuse 60-pack-year, actively smokes 5 to 10 cigarettes/day, hiatal hernia, diastolic dysfunction    Seen by pulmonary and [facility] 2023-noted exertional hypoxemia SPO2 down to 86% on room air. Recommended PET/CT or biopsies for the prior CT chest findings. Presented today for initial evaluation by pulmonary at [facility] since it is closer to her home    Reports previous diagnosis of COPD few years ago, has been tried on different inhalers including Spiriva, then Anoro. Reports improving of intermittent wheezing with using the Anoro or the Spiriva. States that she has not been using inhalers on regularly basis before. Reports chronic bronchitis symptoms including moist cough, minimal sputum production that is mainly clear however still feels congested in her chest.  Describes a good days and bad days, seemed worse with extreme temperatures, always improves with rest.  Does not use PRN albuterol. Smoked for at least 60 years, 60-pack-year, intermittently smokes 1 to 2 cigarettes, lives alone independent in ADLs. No prior reported exacerbation/or frequent oral steroid use. Denies any unintentional weight loss, hemoptysis, anorexia, or significant bone pain. 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 PFT 2022 at [facility]-ratio 51%, FEV1 0.68 L / 36%, FVC 1.35 L / 53%, TLC 81%, %, DLCO 28%    Studies:  Imaging and other studies: I have personally reviewed pertinent films in PACS    CT chest 2019-posterior/medial RLL mucus impaction. Subsegmental atelectasis at the RLL/mild. CT chest 2023-RLL 3 mm nodule/stable. Linear atelectasis at the bases.   Nodular area seen on prior imaging in August obscured by consolidation. Right costophrenic angle consolidation increased. New adjacent tree-in-bud opacity near the terminus of the bronchi. Also new suspect inflammation/atelectasis. Waxing and waning consolidation since 2019. Bronchial wall thickening/secretion at the RLL. Pulmonary function testing:       EKG, Pathology, and Other Studies: I have personally reviewed pertinent reports. Historical Information   Past Medical History:   Diagnosis Date    Cataract     COPD (chronic obstructive pulmonary disease) ([address])     Hypertension      Past Surgical History:   Procedure Laterality Date    HYSTERECTOMY  06/1972    OOPHORECTOMY Left 06/1972     Family History   Problem Relation Age of Onset    Colon cancer Mother     No Known Problems Father     No Known Problems Sister     No Known Problems Maternal Grandmother     No Known Problems Maternal Grandfather     Breast cancer Paternal Grandmother         unknown age    No Known Problems Paternal Grandfather     No Known Problems Son     Breast cancer Maternal Aunt 61    No Known Problems Maternal Aunt     Breast cancer Paternal Aunt         unknown age    No Known Problems Paternal Aunt     No Known Problems Paternal Aunt     No Known Problems Paternal Aunt     BRCA2 Positive Neg Hx     BRCA2 Negative Neg Hx     BRCA1 Positive Neg Hx     BRCA1 Negative Neg Hx     BRCA 1/2 Neg Hx     Ovarian cancer Neg Hx     Endometrial cancer Neg Hx     Breast cancer additional onset Neg Hx          Medications/Allergies: Reviewed    Vitals: Blood pressure 120/68, pulse 70, temperature (!) 97.2 °F (36.2 °C), height 5' 5" (1.651 m), weight 56.7 kg (125 lb), SpO2 90 %. Body mass index is 20.8 kg/m². Oxygen Therapy  SpO2: 90 %      Physical Exam  Body mass index is 20.8 kg/m².    Gen: not in acute distress,   Neck/Eyes: supple, moderate to severe upper thoracic kyphosis, PERRL  Ear: normal appearance, mild but not significant hearing impairment  Nose:  normal nasal mucosa, no drainage  Mouth:  unremarkable/normal appearance of lips, teeth and gums  Oropharynx: mucosa is moist, no focal lesions or erythema  Salivary glands: soft nontender  Chest: normal respiratory efforts, diminished/poor air movement at the bases, clear sounds at the apices  CV: RRR, no murmurs appreciated, no edema  Abdomen: soft, non tender  Extremities:  No observed deformity, mild digital clubbing  Skin: unremarkable  Neuro: AAO X3, no focal motor deficit         Labs:  No results found for: "WBC", "HGB", "HCT", "MCV", "PLT"  No results found for: "GLUCOSE", "CALCIUM", "NA", "K", "CO2", "CL", "BUN", "CREATININE"  No results found for: "IGE"  No results found for: "ALT", "AST", "GGT", "ALKPHOS", "BILITOT"        Portions of the record may have been created with voice recognition software. Occasional wrong word or "sound a like" substitutions may have occurred due to the inherent limitations of voice recognition software. Read the chart carefully and recognize, using context, where substitutions have occurred    Edison Mullen M.D.   [facility]

## 2023-11-08 LAB
DME PARACHUTE DELIVERY DATE REQUESTED: NORMAL
DME PARACHUTE ITEM DESCRIPTION: NORMAL
DME PARACHUTE ORDER STATUS: NORMAL
DME PARACHUTE SUPPLIER NAME: NORMAL
DME PARACHUTE SUPPLIER PHONE: NORMAL

## 2023-12-13 ENCOUNTER — HOSPITAL ENCOUNTER (OUTPATIENT)
Dept: CT IMAGING | Facility: HOSPITAL | Age: 88
Discharge: HOME | End: 2023-12-13
Attending: INTERNAL MEDICINE
Payer: MEDICARE

## 2023-12-13 DIAGNOSIS — R91.8 PULMONARY NODULES: ICD-10-CM

## 2023-12-13 PROCEDURE — G1004 CDSM NDSC: HCPCS

## 2023-12-13 PROCEDURE — 71250 CT THORAX DX C-: CPT

## 2023-12-19 ENCOUNTER — OFFICE VISIT (OUTPATIENT)
Dept: PULMONOLOGY | Facility: CLINIC | Age: 88
End: 2023-12-19

## 2023-12-19 VITALS
SYSTOLIC BLOOD PRESSURE: 124 MMHG | BODY MASS INDEX: 21.33 KG/M2 | OXYGEN SATURATION: 92 % | HEART RATE: 62 BPM | TEMPERATURE: 97.6 F | WEIGHT: 128.2 LBS | DIASTOLIC BLOOD PRESSURE: 86 MMHG

## 2023-12-19 DIAGNOSIS — J44.9 COPD, SEVERE (HCC): Primary | ICD-10-CM

## 2023-12-19 DIAGNOSIS — J96.11 CHRONIC RESPIRATORY FAILURE WITH HYPOXIA (HCC): ICD-10-CM

## 2023-12-19 DIAGNOSIS — R91.8 PULMONARY NODULES: ICD-10-CM

## 2023-12-19 NOTE — PROGRESS NOTES
No Pulmonary Follow-Up Note   Lo House 88 y.o. female MRN: [id number]  12/19/2023      Assessment/Plan:    Diagnoses and all orders for this visit:    COPD, severe (HCC)    Chronic respiratory failure with hypoxia (HCC)    Pulmonary nodules      Vaccines: Does not do vaccines.    CT scan looks better compared prior scans - no evidence of new nodularity   Breathing - feels she is doing well overall with minimal symptoms. Feels the current plan of nebulizer treatments has kept her chest open and she is able to produce mucus secretions easily.  Continue Symbicort 160/4.5, 2 puffs AM and PM daily  Albuterol and saline nebulizer treatments: continue twice per day     Return in about 6 months (around 6/19/2024).    All of Lo's questions were answered prior to leaving the office today.  She is aware to call our office with any further questions or concerns.    History of Present Illness   Reason for Visit: Follow up  Chief Complaint: cough  HPI: Lo House is a 88 y.o. female who presents to the office today for follow up. At last visit she was asked to continue Symbicort twice daily as well as add hypertonic saline / albuterol nebs to clear chest.    She feels her cough has subsided with the addition of nebulizer treatment and has been less productive versus prior. She has no wheezing and minimal shortness of breath - mainly with exertion but it does not slow her down.    She is checking O2 levels at home and getting 90 or better.    Has not been sick since last visit. She is not sleeping well - has always been a light sleeper. Gets about 4-5 hours and does nap during the day.    Review of Systems   Constitutional:  Negative for chills, fatigue, fever and unexpected weight change.   Respiratory:  Positive for shortness of breath. Negative for cough, chest tightness and wheezing.    Cardiovascular:  Negative for chest pain, palpitations and leg swelling.   Gastrointestinal:  Negative for abdominal pain.   All  other systems reviewed and are negative.      Historical Information   Past Medical History:   Diagnosis Date    Cataract     COPD (chronic obstructive pulmonary disease) (HCC)     Hypertension      Past Surgical History:   Procedure Laterality Date    HYSTERECTOMY  06/1972    OOPHORECTOMY Left 06/1972     Family History   Problem Relation Age of Onset    Colon cancer Mother     No Known Problems Father     No Known Problems Sister     No Known Problems Maternal Grandmother     No Known Problems Maternal Grandfather     Breast cancer Paternal Grandmother         unknown age    No Known Problems Paternal Grandfather     No Known Problems Son     Breast cancer Maternal Aunt 60    No Known Problems Maternal Aunt     Breast cancer Paternal Aunt         unknown age    No Known Problems Paternal Aunt     No Known Problems Paternal Aunt     No Known Problems Paternal Aunt     BRCA2 Positive Neg Hx     BRCA2 Negative Neg Hx     BRCA1 Positive Neg Hx     BRCA1 Negative Neg Hx     BRCA 1/2 Neg Hx     Ovarian cancer Neg Hx     Endometrial cancer Neg Hx     Breast cancer additional onset Neg Hx      Social History   Social History     Substance and Sexual Activity   Alcohol Use Not Currently    Comment: very rare     Social History     Substance and Sexual Activity   Drug Use Not Currently     Social History     Tobacco Use   Smoking Status Some Days    Types: Cigarettes   Smokeless Tobacco Never     E-Cigarette/Vaping    E-Cigarette Use Never User      E-Cigarette/Vaping Substances    Nicotine No     THC No     CBD No     Flavoring No     Other No     Unknown No        Meds/Allergies     Current Outpatient Medications:     albuterol (PROVENTIL HFA,VENTOLIN HFA) 90 mcg/act inhaler, inhale 2 puffs by mouth and INTO THE LUNGS every 4 hours, Disp: , Rfl:     budesonide-formoterol (SYMBICORT) 160-4.5 mcg/act inhaler, Inhale 2 puffs 2 (two) times a day Rinse mouth after use., Disp: 10.2 g, Rfl: 5    Calcium Carbonate-Vit D-Min  (Calcium 1200) 4814-8397 MG-UNIT CHEW, Chew 1 tablet daily, Disp: , Rfl:     latanoprost (XALATAN) 0.005 % ophthalmic solution, place 1 drop into both eyes every evening, Disp: , Rfl:     levothyroxine (Euthyrox) 50 mcg tablet, Take 1 tablet (50 mcg total) by mouth daily in the early morning, Disp: 90 tablet, Rfl: 3    metoprolol succinate (TOPROL-XL) 50 mg 24 hr tablet, Take 50 mg by mouth every morning, Disp: , Rfl:   No Known Allergies    Vitals: Blood pressure 124/86, pulse 62, temperature 97.6 °F (36.4 °C), weight 58.2 kg (128 lb 3.2 oz), SpO2 92%. Body mass index is 21.33 kg/m². Oxygen Therapy  SpO2: 92 % room air      Physical Exam  Vitals reviewed.   Constitutional:       Appearance: Normal appearance. She is normal weight.   HENT:      Head: Normocephalic.      Nose: Nose normal.      Mouth/Throat:      Mouth: Mucous membranes are moist.      Pharynx: Oropharynx is clear.   Eyes:      Conjunctiva/sclera: Conjunctivae normal.      Pupils: Pupils are equal, round, and reactive to light.   Cardiovascular:      Rate and Rhythm: Normal rate and regular rhythm.      Pulses: Normal pulses.      Heart sounds: Normal heart sounds.   Pulmonary:      Effort: Pulmonary effort is normal.      Breath sounds: Normal breath sounds.   Abdominal:      General: Abdomen is flat. Bowel sounds are normal.      Palpations: Abdomen is soft.   Musculoskeletal:         General: Normal range of motion.   Skin:     General: Skin is warm.      Capillary Refill: Capillary refill takes less than 2 seconds.   Neurological:      General: No focal deficit present.      Mental Status: She is alert and oriented to person, place, and time. Mental status is at baseline.   Psychiatric:         Mood and Affect: Mood normal.         Behavior: Behavior normal.         Thought Content: Thought content normal.         Judgment: Judgment normal.         Labs: No new labs since last visit    Imaging and other studies: I have personally reviewed  "pertinent reports.   and I have personally reviewed pertinent films in PACS  CT chest wo contrast 12/13/23  IMPRESSION:     Right lower lobe dependent subsegmental atelectasis. No consolidative airspace opacities or suspicious pulmonary nodule identified.    Pulmonary Results (PFTs, PSG): I have personally reviewed pertinent reports.   and I have personally reviewed pertinent films in PACS  PFT 4/1/22:  FEV1/FVC 51%, with FEV1 0.68 L 36% predicted consistent with   severe obstructive ventilatory defect; no post bronchodilator response.   TLC 81%; %; DLCO 28% consistent with severe diffusion limitation.        DONAVAN Morelos  [facility]        Portions of the record may have been created with voice recognition software.  Occasional wrong word or \"sound a like\" substitutions may have occurred due to the inherent limitations of voice recognition software.  Read the chart carefully and recognize, using context, where substitutions have occurred or contact the dictating provider.  "

## 2024-02-21 ENCOUNTER — OFFICE VISIT (OUTPATIENT)
Dept: FAMILY MEDICINE CLINIC | Facility: CLINIC | Age: 89
End: 2024-02-21
Payer: MEDICARE

## 2024-02-21 VITALS
SYSTOLIC BLOOD PRESSURE: 120 MMHG | HEIGHT: 65 IN | WEIGHT: 128 LBS | BODY MASS INDEX: 21.33 KG/M2 | DIASTOLIC BLOOD PRESSURE: 76 MMHG

## 2024-02-21 DIAGNOSIS — J44.9 COPD, SEVERE (HCC): ICD-10-CM

## 2024-02-21 DIAGNOSIS — J42 CHRONIC BRONCHITIS, UNSPECIFIED CHRONIC BRONCHITIS TYPE (HCC): ICD-10-CM

## 2024-02-21 DIAGNOSIS — Z00.00 MEDICARE ANNUAL WELLNESS VISIT, SUBSEQUENT: ICD-10-CM

## 2024-02-21 DIAGNOSIS — B35.1 ONYCHOMYCOSIS: ICD-10-CM

## 2024-02-21 DIAGNOSIS — E03.9 ACQUIRED HYPOTHYROIDISM: Primary | ICD-10-CM

## 2024-02-21 DIAGNOSIS — I10 PRIMARY HYPERTENSION: ICD-10-CM

## 2024-02-21 DIAGNOSIS — J96.11 CHRONIC RESPIRATORY FAILURE WITH HYPOXIA (HCC): ICD-10-CM

## 2024-02-21 PROBLEM — E43 SEVERE PROTEIN-CALORIE MALNUTRITION (HCC): Status: RESOLVED | Noted: 2023-10-23 | Resolved: 2024-02-21

## 2024-02-21 PROCEDURE — G0439 PPPS, SUBSEQ VISIT: HCPCS | Performed by: FAMILY MEDICINE

## 2024-02-21 PROCEDURE — G0444 DEPRESSION SCREEN ANNUAL: HCPCS | Performed by: FAMILY MEDICINE

## 2024-02-21 PROCEDURE — 99214 OFFICE O/P EST MOD 30 MIN: CPT | Performed by: FAMILY MEDICINE

## 2024-02-21 RX ORDER — METOPROLOL SUCCINATE 50 MG/1
50 TABLET, EXTENDED RELEASE ORAL EVERY MORNING
Qty: 30 TABLET | Refills: 5 | Status: SHIPPED | OUTPATIENT
Start: 2024-02-21

## 2024-02-21 NOTE — ASSESSMENT & PLAN NOTE
At last visit had her thyroid supplement reduced to 50 mcg.  Should repeat the thyroid levels and I feel we will probably need to adjust

## 2024-02-21 NOTE — ASSESSMENT & PLAN NOTE
Reviewed recent pulmonology evaluation.  Try to avoid smoking and continue current aerosol therapy

## 2024-02-21 NOTE — PROGRESS NOTES
Assessment and Plan:     Problem List Items Addressed This Visit          Endocrine    Acquired hypothyroidism - Primary     At last visit had her thyroid supplement reduced to 50 mcg.  Should repeat the thyroid levels and I feel we will probably need to adjust         Relevant Medications    metoprolol succinate (TOPROL-XL) 50 mg 24 hr tablet    Other Relevant Orders    TSH, 3rd generation with Free T4 reflex       Respiratory    Chronic bronchitis, unspecified chronic bronchitis type (HCC)     Reviewed recent pulmonology evaluation.  Try to avoid smoking and continue current aerosol therapy         COPD, severe (HCC)     Seemingly improved.  Continue current regimen and follow-up         Chronic respiratory failure with hypoxia (HCC)       Cardiovascular and Mediastinum    Primary hypertension     Appears stable, continue current regimen         Relevant Medications    metoprolol succinate (TOPROL-XL) 50 mg 24 hr tablet       Musculoskeletal and Integument    Onychomycosis     Will make referral to podiatry for footcare         Relevant Orders    Ambulatory Referral to Podiatry     Other Visit Diagnoses       Medicare annual wellness visit, subsequent                Depression Screening and Follow-up Plan: Patient was screened for depression during today's encounter. They screened negative with a PHQ-2 score of 0.    Tobacco Cessation Counseling: Tobacco cessation counseling was provided. The patient is sincerely urged to quit consumption of tobacco. She is not ready to quit tobacco. Medication options not discussed.       Preventive health issues were discussed with patient, and age appropriate screening tests were ordered as noted in patient's After Visit Summary.  Personalized health advice and appropriate referrals for health education or preventive services given if needed, as noted in patient's After Visit Summary.     History of Present Illness:     Patient presents for a Medicare Wellness Visit    Patient  has history of hypertension, hypothyroidism, COPD and does follow-up with pulmonology.  Is having trouble with nail care on her feet       Patient Care Team:  Matt Overton MD as PCP - General (Family Medicine)     Review of Systems:     Review of Systems   Constitutional:  Negative for activity change, appetite change, chills, fatigue, fever and unexpected weight change.   HENT:  Negative for congestion, dental problem (Upper and lower dentures), hearing loss, rhinorrhea and trouble swallowing.    Eyes:  Negative for visual disturbance.   Respiratory:  Positive for cough (Chronic) and shortness of breath (This has improved). Negative for apnea and chest tightness.    Cardiovascular:  Negative for chest pain, palpitations and leg swelling.   Gastrointestinal:  Negative for abdominal distention, abdominal pain, constipation and diarrhea.   Endocrine: Negative for polyuria (Nocturia x 1).   Genitourinary:  Negative for enuresis.   Musculoskeletal:  Positive for myalgias. Negative for arthralgias.   Skin:  Negative for rash.   Allergic/Immunologic: Negative for environmental allergies.   Neurological:  Negative for dizziness, weakness, light-headedness, numbness and headaches.   Hematological:  Negative for adenopathy.   Psychiatric/Behavioral:  Negative for agitation, confusion and sleep disturbance.         Problem List:     Patient Active Problem List   Diagnosis    Chronic respiratory failure with hypoxia (HCC)    Chronic bronchitis, unspecified chronic bronchitis type (HCC)    COPD, severe (HCC)    Centrilobular emphysema (HCC)    Pulmonary nodules    Primary hypertension    Acquired hypothyroidism    Onychomycosis      Past Medical and Surgical History:     Past Medical History:   Diagnosis Date    Cataract     COPD (chronic obstructive pulmonary disease) (HCC)     Hypertension      Past Surgical History:   Procedure Laterality Date    HYSTERECTOMY  06/1972    OOPHORECTOMY Left 06/1972      Family  History:     Family History   Problem Relation Age of Onset    Colon cancer Mother     No Known Problems Father     No Known Problems Sister     No Known Problems Maternal Grandmother     No Known Problems Maternal Grandfather     Breast cancer Paternal Grandmother         unknown age    No Known Problems Paternal Grandfather     No Known Problems Son     Breast cancer Maternal Aunt 60    No Known Problems Maternal Aunt     Breast cancer Paternal Aunt         unknown age    No Known Problems Paternal Aunt     No Known Problems Paternal Aunt     No Known Problems Paternal Aunt     BRCA2 Positive Neg Hx     BRCA2 Negative Neg Hx     BRCA1 Positive Neg Hx     BRCA1 Negative Neg Hx     BRCA 1/2 Neg Hx     Ovarian cancer Neg Hx     Endometrial cancer Neg Hx     Breast cancer additional onset Neg Hx       Social History:     Social History     Socioeconomic History    Marital status: /Civil Union     Spouse name: None    Number of children: None    Years of education: None    Highest education level: None   Occupational History    None   Tobacco Use    Smoking status: Some Days     Types: Cigarettes    Smokeless tobacco: Never   Vaping Use    Vaping status: Never Used   Substance and Sexual Activity    Alcohol use: Not Currently     Comment: very rare    Drug use: Not Currently    Sexual activity: None   Other Topics Concern    None   Social History Narrative    None     Social Determinants of Health     Financial Resource Strain: Low Risk  (2/21/2024)    Overall Financial Resource Strain (CARDIA)     Difficulty of Paying Living Expenses: Not hard at all   Food Insecurity: No Food Insecurity (3/16/2023)    Received from [employer]    Hunger Vital Sign     Worried About Running Out of Food in the Last Year: Never true     Ran Out of Food in the Last Year: Never true   Transportation Needs: No Transportation Needs (2/21/2024)    PRAPARE - Transportation     Lack of Transportation (Medical): No     Lack of  Transportation (Non-Medical): No   Physical Activity: Not on file   Stress: Not on file   Social Connections: Not on file   Intimate Partner Violence: Not on file   Housing Stability: Not on file      Medications and Allergies:     Current Outpatient Medications   Medication Sig Dispense Refill    metoprolol succinate (TOPROL-XL) 50 mg 24 hr tablet Take 1 tablet (50 mg total) by mouth every morning 30 tablet 5    albuterol (PROVENTIL HFA,VENTOLIN HFA) 90 mcg/act inhaler inhale 2 puffs by mouth and INTO THE LUNGS every 4 hours      budesonide-formoterol (SYMBICORT) 160-4.5 mcg/act inhaler Inhale 2 puffs 2 (two) times a day Rinse mouth after use. 10.2 g 5    Calcium Carbonate-Vit D-Min (Calcium 1200) 6948-3536 MG-UNIT CHEW Chew 1 tablet daily      latanoprost (XALATAN) 0.005 % ophthalmic solution place 1 drop into both eyes every evening      levothyroxine (Euthyrox) 50 mcg tablet Take 1 tablet (50 mcg total) by mouth daily in the early morning 90 tablet 3     No current facility-administered medications for this visit.     No Known Allergies   Immunizations:       There is no immunization history on file for this patient.   Health Maintenance:     There are no preventive care reminders to display for this patient.      Topic Date Due    Pneumococcal Vaccine: 65+ Years (1 of 2 - PCV) Never done    Influenza Vaccine (1) Never done    COVID-19 Vaccine (1 - 2023-24 season) Never done      Medicare Screening Tests and Risk Assessments:     Lo is here for her Subsequent Wellness visit. Last Medicare Wellness visit information reviewed, patient interviewed and updates made to the record today.      Health Risk Assessment:   Patient rates overall health as good. Patient feels that their physical health rating is same. Patient is satisfied with their life. Eyesight was rated as same. Hearing was rated as same. Patient feels that their emotional and mental health rating is same. Patients states they are never, rarely  angry. Patient states they are never, rarely unusually tired/fatigued. Pain experienced in the last 7 days has been some. Patient's pain rating has been 2/10. Patient states that she has experienced no weight loss or gain in last 6 months. No issues    Depression Screening:   PHQ-2 Score: 0      Fall Risk Screening:   In the past year, patient has experienced: no history of falling in past year      Urinary Incontinence Screening:   Patient has not leaked urine accidently in the last six months. No issues    Home Safety:  Patient does not have trouble with stairs inside or outside of their home. Patient has working smoke alarms and has working carbon monoxide detector. Home safety hazards include: none. No issues    Nutrition:   Current diet is Regular. No issues    Medications:   Patient is currently taking over-the-counter supplements. OTC medications include: see medication list. Patient is able to manage medications. No issues    Activities of Daily Living (ADLs)/Instrumental Activities of Daily Living (IADLs):   Walk and transfer into and out of bed and chair?: Yes  Dress and groom yourself?: Yes    Bathe or shower yourself?: Yes    Feed yourself? Yes  Do your laundry/housekeeping?: Yes  Manage your money, pay your bills and track your expenses?: Yes  Make your own meals?: Yes    Do your own shopping?: Yes    ADL comments: Overall patient is very functional around the house.  No issues    Previous Hospitalizations:   Any hospitalizations or ED visits within the last 12 months?: No      Hospitalization Comments: Follows up with [facility] for pulmonology and urology    Advance Care Planning:   Living will: Yes    Durable POA for healthcare: Yes    Advanced directive: Yes    Advanced directive counseling given: Yes    Five wishes given: No    End of Life Decisions reviewed with patient: No      Comments: Should bring this into the office to have scanned into the chart    Cognitive Screening:   Provider  "or family/friend/caregiver concerned regarding cognition?: No    PREVENTIVE SCREENINGS      Cardiovascular Screening:    General: Screening Current      Diabetes Screening:     General: Screening Current      Colorectal Cancer Screening:     General: Screening Not Indicated      Breast Cancer Screening:     General: Screening Current      Cervical Cancer Screening:    General: Screening Not Indicated      Osteoporosis Screening:    General: Screening Current      Abdominal Aortic Aneurysm (AAA) Screening:        General: Screening Not Indicated      Lung Cancer Screening:     General: Screening Not Indicated      Hepatitis C Screening:    General: Screening Not Indicated      Preventive Screening Comments: Continue follow-up with eye care and have reports forwarded to office.  Will make referral to podiatry for footcare    Screening, Brief Intervention, and Referral to Treatment (SBIRT)    Screening  Typical number of drinks in a day: 0  Typical number of drinks in a week: 0  Interpretation: Low risk drinking behavior.    Single Item Drug Screening:  How often have you used an illegal drug (including marijuana) or a prescription medication for non-medical reasons in the past year? never    Single Item Drug Screen Score: 0  Interpretation: Negative screen for possible drug use disorder    Brief Intervention  Alcohol & drug use screenings were reviewed. No concerns regarding substance use disorder identified.     Annual Depression Screening  Time spent screening and evaluating the patient for depression during today's encounter was 5 minutes.    No results found.     Physical Exam:     Blood Pressure 120/76 (BP Location: Right arm, Patient Position: Sitting, Cuff Size: Standard)   Height 5' 5\" (1.651 m)   Weight 58.1 kg (128 lb)   Body Mass Index 21.30 kg/m²     Physical Exam  Vitals and nursing note reviewed.   Constitutional:       Appearance: Normal appearance. She is not ill-appearing.   HENT:      Head: " Normocephalic.      Right Ear: Tympanic membrane, ear canal and external ear normal. Decreased hearing (25 dB hearing screen is off.  No difficulty with conversation) noted.      Left Ear: Tympanic membrane, ear canal and external ear normal. Decreased hearing (25 dB hearing screen off at 4000 Hz) noted.      Nose: Nose normal.      Mouth/Throat:      Mouth: Mucous membranes are moist.      Dentition: Has dentures (Upper and lower dentures).   Eyes:      Extraocular Movements: Extraocular movements intact.      Conjunctiva/sclera: Conjunctivae normal.      Pupils: Pupils are equal, round, and reactive to light.   Neck:      Vascular: No carotid bruit.   Cardiovascular:      Rate and Rhythm: Normal rate and regular rhythm.      Pulses:           Dorsalis pedis pulses are 2+ on the right side and 2+ on the left side.        Posterior tibial pulses are 1+ on the right side and 1+ on the left side.      Heart sounds: Normal heart sounds. No murmur (Rate is 72) heard.  Pulmonary:      Effort: Pulmonary effort is normal.      Breath sounds: No wheezing, rhonchi or rales.   Abdominal:      General: Abdomen is flat. There is no distension.      Palpations: Abdomen is soft. There is no mass.      Tenderness: There is no abdominal tenderness.   Musculoskeletal:         General: No swelling.      Cervical back: Normal range of motion.      Right lower leg: No edema.      Left lower leg: No edema.      Right foot: No deformity.      Left foot: No deformity.   Feet:      Right foot:      Protective Sensation: 8 sites tested.  8 sites sensed.      Toenail Condition: Right toenails are abnormally thick and long.   Lymphadenopathy:      Cervical: No cervical adenopathy.   Skin:     General: Skin is warm and dry.      Capillary Refill: Capillary refill takes 2 to 3 seconds.      Findings: No rash.   Neurological:      General: No focal deficit present.      Mental Status: She is alert and oriented to person, place, and time.       Cranial Nerves: No cranial nerve deficit.      Sensory: No sensory deficit.      Motor: No weakness.      Coordination: Coordination normal.      Gait: Gait normal.      Deep Tendon Reflexes: Reflexes abnormal (Reflexes 1+).   Psychiatric:         Mood and Affect: Mood normal.         Behavior: Behavior normal.         Thought Content: Thought content normal.         Judgment: Judgment normal.          Matt Overton MD

## 2024-02-21 NOTE — PATIENT INSTRUCTIONS
Medicare Preventive Visit Patient Instructions  Thank you for completing your Welcome to Medicare Visit or Medicare Annual Wellness Visit today. Your next wellness visit will be due in one year (2/21/2025).  The screening/preventive services that you may require over the next 5-10 years are detailed below. Some tests may not apply to you based off risk factors and/or age. Screening tests ordered at today's visit but not completed yet may show as past due. Also, please note that scanned in results may not display below.  Preventive Screenings:  Service Recommendations Previous Testing/Comments   Colorectal Cancer Screening  * Colonoscopy    * Fecal Occult Blood Test (FOBT)/Fecal Immunochemical Test (FIT)  * Fecal DNA/Cologuard Test  * Flexible Sigmoidoscopy Age: 45-75 years old   Colonoscopy: every 10 years (may be performed more frequently if at higher risk)  OR  FOBT/FIT: every 1 year  OR  Cologuard: every 3 years  OR  Sigmoidoscopy: every 5 years  Screening may be recommended earlier than age 45 if at higher risk for colorectal cancer. Also, an individualized decision between you and your healthcare provider will decide whether screening between the ages of 76-85 would be appropriate. Colonoscopy: Not on file  FOBT/FIT: Not on file  Cologuard: Not on file  Sigmoidoscopy: Not on file    Screening Not Indicated     Breast Cancer Screening Age: 40+ years old  Frequency: every 1-2 years  Not required if history of left and right mastectomy Mammogram: 06/14/2023    Screening Current   Cervical Cancer Screening Between the ages of 21-29, pap smear recommended once every 3 years.   Between the ages of 30-65, can perform pap smear with HPV co-testing every 5 years.   Recommendations may differ for women with a history of total hysterectomy, cervical cancer, or abnormal pap smears in past. Pap Smear: Not on file    Screening Not Indicated   Hepatitis C Screening Once for adults born between 1945 and 1965  More frequently  in patients at high risk for Hepatitis C Hep C Antibody: Not on file        Diabetes Screening 1-2 times per year if you're at risk for diabetes or have pre-diabetes Fasting glucose: No results in last 5 years (No results in last 5 years)  A1C: No results in last 5 years (No results in last 5 years)  Screening Current   Cholesterol Screening Once every 5 years if you don't have a lipid disorder. May order more often based on risk factors. Lipid panel: 10/11/2021    Screening Current     Other Preventive Screenings Covered by Medicare:  Abdominal Aortic Aneurysm (AAA) Screening: covered once if your at risk. You're considered to be at risk if you have a family history of AAA.  Lung Cancer Screening: covers low dose CT scan once per year if you meet all of the following conditions: (1) Age 55-77; (2) No signs or symptoms of lung cancer; (3) Current smoker or have quit smoking within the last 15 years; (4) You have a tobacco smoking history of at least 20 pack years (packs per day multiplied by number of years you smoked); (5) You get a written order from a healthcare provider.  Glaucoma Screening: covered annually if you're considered high risk: (1) You have diabetes OR (2) Family history of glaucoma OR (3)  aged 50 and older OR (4)  American aged 65 and older  Osteoporosis Screening: covered every 2 years if you meet one of the following conditions: (1) You're estrogen deficient and at risk for osteoporosis based off medical history and other findings; (2) Have a vertebral abnormality; (3) On glucocorticoid therapy for more than 3 months; (4) Have primary hyperparathyroidism; (5) On osteoporosis medications and need to assess response to drug therapy.   Last bone density test (DXA Scan): 03/17/2021.  HIV Screening: covered annually if you're between the age of 15-65. Also covered annually if you are younger than 15 and older than 65 with risk factors for HIV infection. For pregnant patients,  it is covered up to 3 times per pregnancy.    Immunizations:  Immunization Recommendations   Influenza Vaccine Annual influenza vaccination during flu season is recommended for all persons aged >= 6 months who do not have contraindications   Pneumococcal Vaccine   * Pneumococcal conjugate vaccine = PCV13 (Prevnar 13), PCV15 (Vaxneuvance), PCV20 (Prevnar 20)  * Pneumococcal polysaccharide vaccine = PPSV23 (Pneumovax) Adults 19-65 yo with certain risk factors or if 65+ yo  If never received any pneumonia vaccine: recommend Prevnar 20 (PCV20)  Give PCV20 if previously received 1 dose of PCV13 or PPSV23   Hepatitis B Vaccine 3 dose series if at intermediate or high risk (ex: diabetes, end stage renal disease, liver disease)   Respiratory syncytial virus (RSV) Vaccine - COVERED BY MEDICARE PART D  * RSVPreF3 (Arexvy) CDC recommends that adults 60 years of age and older may receive a single dose of RSV vaccine using shared clinical decision-making (SCDM)   Tetanus (Td) Vaccine - COST NOT COVERED BY MEDICARE PART B Following completion of primary series, a booster dose should be given every 10 years to maintain immunity against tetanus. Td may also be given as tetanus wound prophylaxis.   Tdap Vaccine - COST NOT COVERED BY MEDICARE PART B Recommended at least once for all adults. For pregnant patients, recommended with each pregnancy.   Shingles Vaccine (Shingrix) - COST NOT COVERED BY MEDICARE PART B  2 shot series recommended in those 19 years and older who have or will have weakened immune systems or those 50 years and older     Health Maintenance Due:  There are no preventive care reminders to display for this patient.  Immunizations Due:      Topic Date Due    Pneumococcal Vaccine: 65+ Years (1 of 2 - PCV) Never done    Influenza Vaccine (1) Never done    COVID-19 Vaccine (1 - 2023-24 season) Never done     Advance Directives   What are advance directives?  Advance directives are legal documents that state your wishes  and plans for medical care. These plans are made ahead of time in case you lose your ability to make decisions for yourself. Advance directives can apply to any medical decision, such as the treatments you want, and if you want to donate organs.   What are the types of advance directives?  There are many types of advance directives, and each state has rules about how to use them. You may choose a combination of any of the following:  Living will:  This is a written record of the treatment you want. You can also choose which treatments you do not want, which to limit, and which to stop at a certain time. This includes surgery, medicine, IV fluid, and tube feedings.   Durable power of  for healthcare (DPAHC):  This is a written record that states who you want to make healthcare choices for you when you are unable to make them for yourself. This person, called a proxy, is usually a family member or a friend. You may choose more than 1 proxy.  Do not resuscitate (DNR) order:  A DNR order is used in case your heart stops beating or you stop breathing. It is a request not to have certain forms of treatment, such as CPR. A DNR order may be included in other types of advance directives.  Medical directive:  This covers the care that you want if you are in a coma, near death, or unable to make decisions for yourself. You can list the treatments you want for each condition. Treatment may include pain medicine, surgery, blood transfusions, dialysis, IV or tube feedings, and a ventilator (breathing machine).  Values history:  This document has questions about your views, beliefs, and how you feel and think about life. This information can help others choose the care that you would choose.  Why are advance directives important?  An advance directive helps you control your care. Although spoken wishes may be used, it is better to have your wishes written down. Spoken wishes can be misunderstood, or not followed.  Treatments may be given even if you do not want them. An advance directive may make it easier for your family to make difficult choices about your care.   Cigarette Smoking and Your Health   Risks to your health if you smoke:  Nicotine and other chemicals found in tobacco damage every cell in your body. Even if you are a light smoker, you have an increased risk for cancer, heart disease, and lung disease. If you are pregnant or have diabetes, smoking increases your risk for complications.   Benefits to your health if you stop smoking:   You decrease respiratory symptoms such as coughing, wheezing, and shortness of breath.   You reduce your risk for cancers of the lung, mouth, throat, kidney, bladder, pancreas, stomach, and cervix. If you already have cancer, you increase the benefits of chemotherapy. You also reduce your risk for cancer returning or a second cancer from developing.   You reduce your risk for heart disease, blood clots, heart attack, and stroke.   You reduce your risk for lung infections, and diseases such as pneumonia, asthma, chronic bronchitis, and emphysema.  Your circulation improves. More oxygen can be delivered to your body. If you have diabetes, you lower your risk for complications, such as kidney, artery, and eye diseases. You also lower your risk for nerve damage. Nerve damage can lead to amputations, poor vision, and blindness.  You improve your body's ability to heal and to fight infections.  For more information and support to stop smoking:   Smokefree.gov  Phone: 4- 292 - 706-3826  Web Address: www."[employer]     © Copyright [employer] 2018 Information is for End User's use only and may not be sold, redistributed or otherwise used for commercial purposes. All illustrations and images included in CareNotes® are the copyrighted property of [employer] Inc[employer] or [employer].  Overall patient seems very functional.  Does need to recheck the thyroid levels and I feel they will  probably need to be adjusted.  I did give refills for the metoprolol.  Continue follow-up with pulmonology and try to avoid smoking.  Try to walk as tolerated

## 2024-02-26 ENCOUNTER — LAB (OUTPATIENT)
Dept: LAB | Facility: CLINIC | Age: 89
End: 2024-02-26
Payer: MEDICARE

## 2024-02-26 DIAGNOSIS — E03.9 ACQUIRED HYPOTHYROIDISM: ICD-10-CM

## 2024-02-26 LAB — TSH SERPL DL<=0.05 MIU/L-ACNC: 0.97 UIU/ML (ref 0.45–4.5)

## 2024-02-26 PROCEDURE — 36415 COLL VENOUS BLD VENIPUNCTURE: CPT

## 2024-02-26 PROCEDURE — 84443 ASSAY THYROID STIM HORMONE: CPT

## 2024-05-16 ENCOUNTER — OFFICE VISIT (OUTPATIENT)
Dept: FAMILY MEDICINE CLINIC | Facility: CLINIC | Age: 89
End: 2024-05-16
Payer: MEDICARE

## 2024-05-16 VITALS
BODY MASS INDEX: 21.83 KG/M2 | SYSTOLIC BLOOD PRESSURE: 124 MMHG | DIASTOLIC BLOOD PRESSURE: 72 MMHG | WEIGHT: 131 LBS | HEIGHT: 65 IN

## 2024-05-16 DIAGNOSIS — I10 PRIMARY HYPERTENSION: ICD-10-CM

## 2024-05-16 DIAGNOSIS — J42 CHRONIC BRONCHITIS, UNSPECIFIED CHRONIC BRONCHITIS TYPE (HCC): ICD-10-CM

## 2024-05-16 DIAGNOSIS — E03.9 ACQUIRED HYPOTHYROIDISM: ICD-10-CM

## 2024-05-16 DIAGNOSIS — M79.622 LEFT UPPER ARM PAIN: Primary | ICD-10-CM

## 2024-05-16 PROCEDURE — 99214 OFFICE O/P EST MOD 30 MIN: CPT | Performed by: FAMILY MEDICINE

## 2024-05-16 PROCEDURE — G2211 COMPLEX E/M VISIT ADD ON: HCPCS | Performed by: FAMILY MEDICINE

## 2024-05-16 NOTE — PROGRESS NOTES
Ambulatory Visit  Name: Lo House      : 1935      MRN: [id number]  Encounter Provider: Matt Overton MD  Encounter Date: 2024   Encounter department: [facility] PRIMARY CARE    Assessment & Plan   1. Left upper arm pain  Assessment & Plan:  I feel this is muscular with irritation in the triceps area.  Does have weakness in the arms and needs to do mild exercise to strengthen the arms also needs to do more walking activity to strengthen the legs  2. Primary hypertension  Assessment & Plan:  Overall stable, continue current regimen  3. Chronic bronchitis, unspecified chronic bronchitis type (HCC)  Assessment & Plan:  Continues to have her cough.  Try to push overall activity as tolerated  4. Acquired hypothyroidism  Assessment & Plan:  Overall stable, continue current regimen         History of Present Illness         Patient has history of hypertension, hypothyroidism, COPD and does follow-up with pulmonology.  Has been having trouble with weakness particularly with her arms but has been very inactive.  Also has been having some trouble with pain in the left upper arm      Review of Systems   Constitutional:  Negative for activity change, appetite change, chills, fatigue, fever and unexpected weight change.   HENT:  Negative for congestion, rhinorrhea and trouble swallowing.    Eyes:  Negative for visual disturbance.   Respiratory:  Positive for cough. Negative for apnea, chest tightness and shortness of breath.    Cardiovascular:  Negative for chest pain, palpitations and leg swelling.   Gastrointestinal:  Negative for abdominal distention, abdominal pain, constipation and diarrhea.   Endocrine: Negative for polyuria (Nocturia x 1).   Genitourinary:  Positive for enuresis.   Musculoskeletal:  Positive for arthralgias (Pain in the left upper arm). Negative for myalgias.   Skin:  Negative for rash.   Allergic/Immunologic: Negative for environmental allergies.    Neurological:  Positive for weakness (Currently in the arms but also with walking). Negative for dizziness, light-headedness, numbness and headaches.   Hematological:  Negative for adenopathy. Does not bruise/bleed easily.   Psychiatric/Behavioral:  Negative for agitation, confusion and sleep disturbance.      Past Medical History:   Diagnosis Date    Cataract     COPD (chronic obstructive pulmonary disease) (HCC)     Hypertension      Past Surgical History:   Procedure Laterality Date    HYSTERECTOMY  06/1972    OOPHORECTOMY Left 06/1972     Family History   Problem Relation Age of Onset    Colon cancer Mother     No Known Problems Father     No Known Problems Sister     No Known Problems Maternal Grandmother     No Known Problems Maternal Grandfather     Breast cancer Paternal Grandmother         unknown age    No Known Problems Paternal Grandfather     No Known Problems Son     Breast cancer Maternal Aunt 60    No Known Problems Maternal Aunt     Breast cancer Paternal Aunt         unknown age    No Known Problems Paternal Aunt     No Known Problems Paternal Aunt     No Known Problems Paternal Aunt     BRCA2 Positive Neg Hx     BRCA2 Negative Neg Hx     BRCA1 Positive Neg Hx     BRCA1 Negative Neg Hx     BRCA 1/2 Neg Hx     Ovarian cancer Neg Hx     Endometrial cancer Neg Hx     Breast cancer additional onset Neg Hx      Social History     Tobacco Use    Smoking status: Some Days     Types: Cigarettes    Smokeless tobacco: Never   Vaping Use    Vaping status: Never Used   Substance and Sexual Activity    Alcohol use: Not Currently     Comment: very rare    Drug use: Not Currently    Sexual activity: Not on file     Current Outpatient Medications on File Prior to Visit   Medication Sig    albuterol (PROVENTIL HFA,VENTOLIN HFA) 90 mcg/act inhaler inhale 2 puffs by mouth and INTO THE LUNGS every 4 hours    budesonide-formoterol (SYMBICORT) 160-4.5 mcg/act inhaler Inhale 2 puffs 2 (two) times a day Rinse mouth  "after use.    Calcium Carbonate-Vit D-Min (Calcium 1200) 0337-1619 MG-UNIT CHEW Chew 1 tablet daily    latanoprost (XALATAN) 0.005 % ophthalmic solution place 1 drop into both eyes every evening    levothyroxine (Euthyrox) 50 mcg tablet Take 1 tablet (50 mcg total) by mouth daily in the early morning    metoprolol succinate (TOPROL-XL) 50 mg 24 hr tablet Take 1 tablet (50 mg total) by mouth every morning     No Known Allergies    There is no immunization history on file for this patient.  Objective     Blood Pressure 124/72 (BP Location: Left arm, Patient Position: Sitting, Cuff Size: Standard)   Height 5' 5\" (1.651 m)   Weight 59.4 kg (131 lb)   Body Mass Index 21.80 kg/m²     Physical Exam  Vitals and nursing note reviewed.   Constitutional:       Appearance: Normal appearance. She is not ill-appearing.   HENT:      Head: Normocephalic.      Right Ear: Tympanic membrane, ear canal and external ear normal.      Left Ear: Tympanic membrane, ear canal and external ear normal.      Nose: Nose normal.      Mouth/Throat:      Mouth: Mucous membranes are moist.   Eyes:      Conjunctiva/sclera: Conjunctivae normal.   Neck:      Vascular: No carotid bruit.   Cardiovascular:      Rate and Rhythm: Normal rate and regular rhythm.      Pulses: Normal pulses.      Heart sounds: Normal heart sounds. No murmur (Rate is 72) heard.  Pulmonary:      Effort: Pulmonary effort is normal.      Breath sounds: Normal breath sounds.   Abdominal:      General: Abdomen is flat. There is no distension.      Palpations: Abdomen is soft. There is no mass.      Tenderness: There is no abdominal tenderness.   Musculoskeletal:         General: No swelling.        Arms:       Cervical back: Normal range of motion. No tenderness.      Right lower leg: No edema.      Left lower leg: No edema.   Lymphadenopathy:      Cervical: No cervical adenopathy.   Skin:     General: Skin is warm and dry.      Findings: No rash.   Neurological:      Mental " Status: She is alert and oriented to person, place, and time.      Motor: Weakness (In the legs) present.      Coordination: Coordination normal.      Gait: Gait abnormal.      Deep Tendon Reflexes: Reflexes abnormal (Flexes 1+).   Psychiatric:         Mood and Affect: Mood normal.         Behavior: Behavior normal.         Thought Content: Thought content normal.         Judgment: Judgment normal.       Administrative Statements

## 2024-05-16 NOTE — PATIENT INSTRUCTIONS
Is having trouble with weakness and needs to get some exercises to strengthen the arms.  Should push walking activity every day to strengthen the legs.  Does have some soreness in the left upper arm which I feel is muscular may use heating pad for 20 minutes at a time.  Suggested trying Tylenol for the arthritis which may help the problem with the hands.  Will continue on all meds as is

## 2024-05-17 NOTE — ASSESSMENT & PLAN NOTE
I feel this is muscular with irritation in the triceps area.  Does have weakness in the arms and needs to do mild exercise to strengthen the arms also needs to do more walking activity to strengthen the legs

## 2024-08-09 ENCOUNTER — RA CDI HCC (OUTPATIENT)
Dept: OTHER | Facility: HOSPITAL | Age: 89
End: 2024-08-09

## 2024-08-16 ENCOUNTER — OFFICE VISIT (OUTPATIENT)
Dept: FAMILY MEDICINE CLINIC | Facility: CLINIC | Age: 89
End: 2024-08-16
Payer: MEDICARE

## 2024-08-16 VITALS
BODY MASS INDEX: 21.83 KG/M2 | WEIGHT: 131 LBS | SYSTOLIC BLOOD PRESSURE: 120 MMHG | HEIGHT: 65 IN | DIASTOLIC BLOOD PRESSURE: 76 MMHG

## 2024-08-16 DIAGNOSIS — J42 CHRONIC BRONCHITIS, UNSPECIFIED CHRONIC BRONCHITIS TYPE (HCC): ICD-10-CM

## 2024-08-16 DIAGNOSIS — Z12.31 BREAST CANCER SCREENING BY MAMMOGRAM: ICD-10-CM

## 2024-08-16 DIAGNOSIS — E03.9 ACQUIRED HYPOTHYROIDISM: Primary | ICD-10-CM

## 2024-08-16 DIAGNOSIS — I10 PRIMARY HYPERTENSION: ICD-10-CM

## 2024-08-16 DIAGNOSIS — R60.0 LOCALIZED EDEMA: ICD-10-CM

## 2024-08-16 PROCEDURE — G2211 COMPLEX E/M VISIT ADD ON: HCPCS | Performed by: FAMILY MEDICINE

## 2024-08-16 PROCEDURE — 99214 OFFICE O/P EST MOD 30 MIN: CPT | Performed by: FAMILY MEDICINE

## 2024-08-16 RX ORDER — METOPROLOL SUCCINATE 50 MG/1
50 TABLET, EXTENDED RELEASE ORAL EVERY MORNING
Qty: 30 TABLET | Refills: 5 | Status: SHIPPED | OUTPATIENT
Start: 2024-08-16

## 2024-08-16 NOTE — PATIENT INSTRUCTIONS
Is having trouble with swelling in both feet more on the left.  Try to prop the feet up when sitting.  Try to limit all fluids to 1-1/2 quarts a day.  Will check renal panel and recheck thyroid levels.  Is due for follow-up with pulmonology.  Try to get a little bit more walking activity

## 2024-08-16 NOTE — PROGRESS NOTES
Ambulatory Visit  Name: Lo House      : 1935      MRN: [id number]  Encounter Provider: Matt Overton MD  Encounter Date: 2024   Encounter department: [facility] PRIMARY CARE    Assessment & Plan   1. Acquired hypothyroidism  Assessment & Plan:  Will check thyroid levels and adjust meds accordingly  Orders:  -     TSH, 3rd generation with Free T4 reflex; Future  2. Primary hypertension  Assessment & Plan:  Overall stable.  Will check renal panel.  Continue current medication  Orders:  -     metoprolol succinate (TOPROL-XL) 50 mg 24 hr tablet; Take 1 tablet (50 mg total) by mouth every morning  -     Renal function panel; Future  3. Breast cancer screening by mammogram  -     Mammo breast specimen bilateral; Future  4. Localized edema  Assessment & Plan:  Discussed problem.  I feel part of this relates to excessive fluid intake and should limit all fluids to 1-1/2 quarts a day and elevate legs when sitting.  Will check renal panel and thyroid levels  5. Chronic bronchitis, unspecified chronic bronchitis type (HCC)  Assessment & Plan:  Has not been taking the Symbicort.  Is due for follow-up with pulmonology.  Does have some wheezing today.  Advised should probably restart the Symbicort         History of Present Illness           Patient has history of hypertension, hypothyroidism, COPD and does follow-up with pulmonology.  Has been having trouble with swelling in the left foot and is concerned this could relate to heart failure.  Does drink a lot of fluid during the day including 8 glasses of water.      Review of Systems   Constitutional:  Negative for chills and fever.   HENT:  Negative for congestion, sore throat and trouble swallowing.    Eyes:  Negative for pain and visual disturbance.   Respiratory:  Positive for cough. Negative for chest tightness and shortness of breath.    Cardiovascular:  Positive for leg swelling. Negative for chest pain and palpitations.    Gastrointestinal:  Negative for abdominal pain, constipation, diarrhea and vomiting.   Endocrine: Negative for polyuria.   Genitourinary:  Positive for enuresis.   Musculoskeletal:  Positive for arthralgias. Negative for back pain.   Skin:  Negative for color change and rash.   Allergic/Immunologic: Negative for environmental allergies.   Neurological:  Negative for dizziness, seizures, syncope and light-headedness.   Psychiatric/Behavioral:  Negative for sleep disturbance.    All other systems reviewed and are negative.    Past Medical History:   Diagnosis Date    Cataract     COPD (chronic obstructive pulmonary disease) (HCC)     Hypertension      Past Surgical History:   Procedure Laterality Date    HYSTERECTOMY  06/1972    OOPHORECTOMY Left 06/1972     Family History   Problem Relation Age of Onset    Colon cancer Mother     No Known Problems Father     No Known Problems Sister     No Known Problems Maternal Grandmother     No Known Problems Maternal Grandfather     Breast cancer Paternal Grandmother         unknown age    No Known Problems Paternal Grandfather     No Known Problems Son     Breast cancer Maternal Aunt 60    No Known Problems Maternal Aunt     Breast cancer Paternal Aunt         unknown age    No Known Problems Paternal Aunt     No Known Problems Paternal Aunt     No Known Problems Paternal Aunt     BRCA2 Positive Neg Hx     BRCA2 Negative Neg Hx     BRCA1 Positive Neg Hx     BRCA1 Negative Neg Hx     BRCA 1/2 Neg Hx     Ovarian cancer Neg Hx     Endometrial cancer Neg Hx     Breast cancer additional onset Neg Hx      Social History     Tobacco Use    Smoking status: Some Days     Types: Cigarettes    Smokeless tobacco: Never   Vaping Use    Vaping status: Never Used   Substance and Sexual Activity    Alcohol use: Not Currently     Comment: very rare    Drug use: Not Currently    Sexual activity: Not on file     Current Outpatient Medications on File Prior to Visit   Medication Sig    albuterol  "(2.5 mg/3 mL) 0.083 % nebulizer solution inhale contents of 1 vial ( 3 milliliters ) in nebulizer by mouth and INTO THE LUNGS twice a day    albuterol (PROVENTIL HFA,VENTOLIN HFA) 90 mcg/act inhaler inhale 2 puffs by mouth and INTO THE LUNGS every 4 hours    budesonide-formoterol (SYMBICORT) 160-4.5 mcg/act inhaler Inhale 2 puffs 2 (two) times a day Rinse mouth after use.    Calcium Carbonate-Vit D-Min (Calcium 1200) 7594-1889 MG-UNIT CHEW Chew 1 tablet daily    latanoprost (XALATAN) 0.005 % ophthalmic solution place 1 drop into both eyes every evening    levothyroxine (Euthyrox) 50 mcg tablet Take 1 tablet (50 mcg total) by mouth daily in the early morning    sodium chloride 3 % inhalation solution INHALE CONTENTS OF 1 VIAL (4 ML) BY NEBULIZATION twice a day    Synthroid 100 MCG tablet take 1 tablet by mouth every morning 30 TO 60 MINUTES before EATING     No Known Allergies    There is no immunization history on file for this patient.  Objective     Blood Pressure 120/76 (BP Location: Left arm, Patient Position: Sitting, Cuff Size: Large)   Height 5' 5\" (1.651 m)   Weight 59.4 kg (131 lb)   Body Mass Index 21.80 kg/m²     Physical Exam  Vitals and nursing note reviewed.   Constitutional:       General: She is not in acute distress.     Appearance: Normal appearance. She is well-developed.   HENT:      Head: Normocephalic and atraumatic.      Nose: Nose normal.   Eyes:      Conjunctiva/sclera: Conjunctivae normal.   Neck:      Vascular: No carotid bruit.   Cardiovascular:      Rate and Rhythm: Normal rate and regular rhythm.      Heart sounds: No murmur (Rate is 78) heard.  Pulmonary:      Effort: Pulmonary effort is normal. No respiratory distress.      Breath sounds: Wheezing (Trace expiratory wheezing in all fields) present. No rhonchi.   Abdominal:      Palpations: Abdomen is soft.      Tenderness: There is no abdominal tenderness.   Musculoskeletal:         General: No swelling.      Cervical back: Neck " supple. No tenderness.      Right lower leg: Edema (Trace edema) present.      Left lower leg: Edema (1+ edema) present.   Lymphadenopathy:      Cervical: No cervical adenopathy.   Skin:     General: Skin is warm and dry.      Capillary Refill: Capillary refill takes less than 2 seconds.   Neurological:      Mental Status: She is alert.      Motor: No weakness.      Coordination: Coordination normal.      Gait: Gait abnormal.      Deep Tendon Reflexes: Reflexes abnormal (Reflexes 1+).   Psychiatric:         Mood and Affect: Mood normal.

## 2024-08-17 PROBLEM — R60.0 LOCALIZED EDEMA: Status: ACTIVE | Noted: 2024-08-17

## 2024-08-17 NOTE — ASSESSMENT & PLAN NOTE
Discussed problem.  I feel part of this relates to excessive fluid intake and should limit all fluids to 1-1/2 quarts a day and elevate legs when sitting.  Will check renal panel and thyroid levels

## 2024-08-17 NOTE — ASSESSMENT & PLAN NOTE
Has not been taking the Symbicort.  Is due for follow-up with pulmonology.  Does have some wheezing today.  Advised should probably restart the Symbicort

## 2024-08-19 ENCOUNTER — LAB (OUTPATIENT)
Dept: LAB | Facility: CLINIC | Age: 89
End: 2024-08-19
Payer: MEDICARE

## 2024-08-19 DIAGNOSIS — E03.9 ACQUIRED HYPOTHYROIDISM: ICD-10-CM

## 2024-08-19 DIAGNOSIS — I10 PRIMARY HYPERTENSION: ICD-10-CM

## 2024-08-19 LAB
ALBUMIN SERPL BCG-MCNC: 4.1 G/DL (ref 3.5–5)
ANION GAP SERPL CALCULATED.3IONS-SCNC: 8 MMOL/L (ref 4–13)
BUN SERPL-MCNC: 21 MG/DL (ref 5–25)
CALCIUM SERPL-MCNC: 9.1 MG/DL (ref 8.4–10.2)
CHLORIDE SERPL-SCNC: 100 MMOL/L (ref 96–108)
CO2 SERPL-SCNC: 32 MMOL/L (ref 21–32)
CREAT SERPL-MCNC: 0.73 MG/DL (ref 0.6–1.3)
GFR SERPL CREATININE-BSD FRML MDRD: 74 ML/MIN/1.73SQ M
GLUCOSE SERPL-MCNC: 101 MG/DL (ref 65–140)
PHOSPHATE SERPL-MCNC: 3.6 MG/DL (ref 2.3–4.1)
POTASSIUM SERPL-SCNC: 4.2 MMOL/L (ref 3.5–5.3)
SODIUM SERPL-SCNC: 140 MMOL/L (ref 135–147)
TSH SERPL DL<=0.05 MIU/L-ACNC: 1.99 UIU/ML (ref 0.45–4.5)

## 2024-08-19 PROCEDURE — 80069 RENAL FUNCTION PANEL: CPT

## 2024-08-19 PROCEDURE — 36415 COLL VENOUS BLD VENIPUNCTURE: CPT

## 2024-08-19 PROCEDURE — 84443 ASSAY THYROID STIM HORMONE: CPT

## 2024-08-21 ENCOUNTER — TELEPHONE (OUTPATIENT)
Age: 89
End: 2024-08-21

## 2024-08-21 DIAGNOSIS — E03.9 ACQUIRED HYPOTHYROIDISM: Primary | ICD-10-CM

## 2024-08-21 DIAGNOSIS — I10 PRIMARY HYPERTENSION: ICD-10-CM

## 2024-08-21 RX ORDER — METOPROLOL SUCCINATE 50 MG/1
50 TABLET, EXTENDED RELEASE ORAL EVERY MORNING
Qty: 30 TABLET | Refills: 5 | Status: CANCELLED | OUTPATIENT
Start: 2024-08-21

## 2024-08-21 RX ORDER — METOPROLOL SUCCINATE 50 MG/1
50 TABLET, EXTENDED RELEASE ORAL EVERY MORNING
Qty: 30 TABLET | Refills: 5 | OUTPATIENT
Start: 2024-08-21

## 2024-08-21 RX ORDER — LEVOTHYROXINE SODIUM 100 MCG
100 TABLET ORAL DAILY
Qty: 90 TABLET | Refills: 3 | Status: SHIPPED | OUTPATIENT
Start: 2024-08-21

## 2024-08-21 RX ORDER — LEVOTHYROXINE SODIUM 100 MCG
TABLET ORAL
Status: CANCELLED | OUTPATIENT
Start: 2024-08-21

## 2024-08-21 NOTE — TELEPHONE ENCOUNTER
"Patient returned call for results. Read Dr. Overton note to her:   Please call patient and inform of normal labs.  Thyroid levels are okay     Patient would like to know what she is suppose to do about the swelling in her ankles because she \"can't keep walking around like that.\"  Please follow up with patient.   "

## 2024-08-22 DIAGNOSIS — R60.0 LOCALIZED EDEMA: Primary | ICD-10-CM

## 2024-08-22 RX ORDER — POTASSIUM CHLORIDE 750 MG/1
10 TABLET, EXTENDED RELEASE ORAL 2 TIMES DAILY
Qty: 30 TABLET | Refills: 5 | Status: SHIPPED | OUTPATIENT
Start: 2024-08-22

## 2024-08-22 RX ORDER — FUROSEMIDE 20 MG
20 TABLET ORAL DAILY PRN
Qty: 30 TABLET | Refills: 5 | Status: SHIPPED | OUTPATIENT
Start: 2024-08-22

## 2024-08-27 ENCOUNTER — OFFICE VISIT (OUTPATIENT)
Dept: PULMONOLOGY | Facility: CLINIC | Age: 89
End: 2024-08-27
Payer: MEDICARE

## 2024-08-27 VITALS
DIASTOLIC BLOOD PRESSURE: 62 MMHG | OXYGEN SATURATION: 90 % | TEMPERATURE: 97.2 F | SYSTOLIC BLOOD PRESSURE: 118 MMHG | HEIGHT: 65 IN | WEIGHT: 131 LBS | BODY MASS INDEX: 21.83 KG/M2 | HEART RATE: 75 BPM

## 2024-08-27 DIAGNOSIS — J42 CHRONIC BRONCHITIS, UNSPECIFIED CHRONIC BRONCHITIS TYPE (HCC): ICD-10-CM

## 2024-08-27 DIAGNOSIS — J43.2 CENTRILOBULAR EMPHYSEMA (HCC): ICD-10-CM

## 2024-08-27 DIAGNOSIS — J43.2 CENTRILOBULAR EMPHYSEMA (HCC): Primary | ICD-10-CM

## 2024-08-27 PROBLEM — J96.11 CHRONIC RESPIRATORY FAILURE WITH HYPOXIA (HCC): Status: RESOLVED | Noted: 2023-10-23 | Resolved: 2024-08-27

## 2024-08-27 PROCEDURE — G2211 COMPLEX E/M VISIT ADD ON: HCPCS | Performed by: NURSE PRACTITIONER

## 2024-08-27 PROCEDURE — 99213 OFFICE O/P EST LOW 20 MIN: CPT | Performed by: NURSE PRACTITIONER

## 2024-08-27 RX ORDER — ALBUTEROL SULFATE 0.83 MG/ML
2.5 SOLUTION RESPIRATORY (INHALATION) EVERY 6 HOURS PRN
Qty: 90 ML | Refills: 1 | Status: SHIPPED | OUTPATIENT
Start: 2024-08-27

## 2024-08-27 RX ORDER — BUDESONIDE AND FORMOTEROL FUMARATE DIHYDRATE 160; 4.5 UG/1; UG/1
2 AEROSOL RESPIRATORY (INHALATION) 2 TIMES DAILY
Qty: 10.2 G | Refills: 5 | Status: SHIPPED | OUTPATIENT
Start: 2024-08-27

## 2024-08-27 NOTE — PROGRESS NOTES
Pulmonary Follow-Up Note   Lo House 88 y.o. female MRN: [id number]  8/27/2024      Assessment/Plan:    Problem List Items Addressed This Visit       Chronic bronchitis, unspecified chronic bronchitis type (HCC)     Severe COPD with ratio 51%, FEV1 0.68 L / 36% (2022 PFT via "[employer] Gold 3D staged prior.     Symptoms improved to the degree that she stopped Symbicort however wheezing developed and PCP asked her 10 days ago to resume Symbicort. On exam today she is clear.  Continue Symbicort AM and PM - counseled patient that this is a maintenance medication.  Albuterol HFA up to 4x per day if needed or  Albuterol via nebulizer up to 4x per day if needed  I did review how to use Symbicort inhaler as well as use the nebulizer; patient was encouraged to return for demonstration if she encounters any issues         Relevant Medications    budesonide-formoterol (SYMBICORT) 160-4.5 mcg/act inhaler    albuterol (2.5 mg/3 mL) 0.083 % nebulizer solution    Centrilobular emphysema (HCC) - Primary    Relevant Medications    budesonide-formoterol (SYMBICORT) 160-4.5 mcg/act inhaler    albuterol (2.5 mg/3 mL) 0.083 % nebulizer solution     Vaccines: up to date    Return in about 6 months (around 2/27/2025).    All of Lo's questions were answered prior to leaving the office today.  She is aware to call our office with any further questions or concerns.    History of Present Illness   Reason for Visit: Follow up  Chief Complaint: cough  HPI: Lo House is a 88 y.o. female who presents to the office today returning for routine follow up.     Stopped Symbicort and developed wheezing - PCP advised 10 days ago to restart Symbicort in addition to as-needed albuterol.  Today she feels she is wheezy and has a cough productive of clear phlegm, and she reports some issues with Symbicort making her throat hurt - that is why she stops taking it. She is trying the nebulizer at home but not sure if it is going  correctly.  Smokin-2 cig per day    Review of Systems  Please note that a 14-point review of systems was performed to include Constitutional, HEENT, Respiratory, CVS, GI, , Musculoskeletal, Integumentary, Neurologic, Rheumatologic, Endocrinologic, Psychiatric, Lymphatic, and Hematologic/Oncologic systems were reviewed and are negative unless otherwise stated in HPI. Positive and negative findings pertinent to this evaluation are incorporated into the history of present illness.       Historical Information   Past Medical History:   Diagnosis Date    Cataract     COPD (chronic obstructive pulmonary disease) (HCC)     Hypertension      Past Surgical History:   Procedure Laterality Date    HYSTERECTOMY  1972    OOPHORECTOMY Left 1972     Family History   Problem Relation Age of Onset    Colon cancer Mother     No Known Problems Father     No Known Problems Sister     No Known Problems Maternal Grandmother     No Known Problems Maternal Grandfather     Breast cancer Paternal Grandmother         unknown age    No Known Problems Paternal Grandfather     No Known Problems Son     Breast cancer Maternal Aunt 60    No Known Problems Maternal Aunt     Breast cancer Paternal Aunt         unknown age    No Known Problems Paternal Aunt     No Known Problems Paternal Aunt     No Known Problems Paternal Aunt     BRCA2 Positive Neg Hx     BRCA2 Negative Neg Hx     BRCA1 Positive Neg Hx     BRCA1 Negative Neg Hx     BRCA 1/2 Neg Hx     Ovarian cancer Neg Hx     Endometrial cancer Neg Hx     Breast cancer additional onset Neg Hx      Social History   Social History     Substance and Sexual Activity   Alcohol Use Not Currently    Comment: very rare     Social History     Substance and Sexual Activity   Drug Use Not Currently     Social History     Tobacco Use   Smoking Status Some Days    Types: Cigarettes   Smokeless Tobacco Never     E-Cigarette/Vaping    E-Cigarette Use Never User      E-Cigarette/Vaping Substances     "Nicotine No     THC No     CBD No     Flavoring No     Other No     Unknown No        Meds/Allergies     Current Outpatient Medications:     albuterol (2.5 mg/3 mL) 0.083 % nebulizer solution, Take 3 mL (2.5 mg total) by nebulization every 6 (six) hours as needed for wheezing or shortness of breath, Disp: 90 mL, Rfl: 1    budesonide-formoterol (SYMBICORT) 160-4.5 mcg/act inhaler, Inhale 2 puffs 2 (two) times a day Rinse mouth after use., Disp: 10.2 g, Rfl: 5    Calcium Carbonate-Vit D-Min (Calcium 1200) 7258-2788 MG-UNIT CHEW, Chew 1 tablet daily, Disp: , Rfl:     metoprolol succinate (TOPROL-XL) 50 mg 24 hr tablet, Take 1 tablet (50 mg total) by mouth every morning, Disp: 30 tablet, Rfl: 5    sodium chloride 3 % inhalation solution, INHALE CONTENTS OF 1 VIAL (4 ML) BY NEBULIZATION twice a day, Disp: , Rfl:     Synthroid 100 MCG tablet, Take 1 tablet (100 mcg total) by mouth daily, Disp: 90 tablet, Rfl: 3    albuterol (PROVENTIL HFA,VENTOLIN HFA) 90 mcg/act inhaler, inhale 2 puffs by mouth and INTO THE LUNGS every 4 hours (Patient not taking: Reported on 8/27/2024), Disp: , Rfl:     furosemide (LASIX) 20 mg tablet, Take 1 tablet (20 mg total) by mouth daily as needed (If noticing swelling in the lower legs and feet) (Patient not taking: Reported on 8/27/2024), Disp: 30 tablet, Rfl: 5    latanoprost (XALATAN) 0.005 % ophthalmic solution, place 1 drop into both eyes every evening, Disp: , Rfl:     potassium chloride (Klor-Con M10) 10 mEq tablet, Take 1 tablet (10 mEq total) by mouth 2 (two) times a day Only take on the days that is taking the furosemide (Patient not taking: Reported on 8/27/2024), Disp: 30 tablet, Rfl: 5  No Known Allergies    Vitals: Blood pressure 118/62, pulse 75, temperature (!) 97.2 °F (36.2 °C), height 5' 5\" (1.651 m), weight 59.4 kg (131 lb), SpO2 90%. Body mass index is 21.8 kg/m². Oxygen Therapy  SpO2: 90 %      Physical Exam  Vitals reviewed.   Constitutional:       Appearance: Normal " "appearance.   HENT:      Head: Normocephalic.      Nose: Nose normal.      Mouth/Throat:      Mouth: Mucous membranes are moist.      Pharynx: Oropharynx is clear.   Cardiovascular:      Rate and Rhythm: Normal rate and regular rhythm.      Pulses: Normal pulses.      Heart sounds: Normal heart sounds.   Pulmonary:      Effort: Pulmonary effort is normal.      Breath sounds: Normal breath sounds.   Musculoskeletal:         General: Normal range of motion.   Skin:     General: Skin is warm.      Capillary Refill: Capillary refill takes less than 2 seconds.   Neurological:      General: No focal deficit present.      Mental Status: She is alert and oriented to person, place, and time.   Psychiatric:         Mood and Affect: Mood normal.         Behavior: Behavior normal.         Labs: No results found for: \"WBC\", \"HGB\", \"HCT\", \"MCV\", \"PLT\", \"EOSPCT\", \"EOSABS\", \"SEGSPCT\", \"SEGSABS\", \"MONOPCT\", \"MONOABS\", \"BANDSABS\", \"BANDSPCT\"  Lab Results   Component Value Date    CALCIUM 9.1 08/19/2024    K 4.2 08/19/2024    CO2 32 08/19/2024     08/19/2024    BUN 21 08/19/2024    CREATININE 0.73 08/19/2024     No results found for: \"IGE\", \"RAST\"  Lab Results   Component Value Date    ALT 13 10/11/2021    AST 12 10/11/2021    ALKPHOS 77 10/11/2021       DONAVAN Morelos  [facility]        Portions of the record may have been created with voice recognition software.  Occasional wrong word or \"sound a like\" substitutions may have occurred due to the inherent limitations of voice recognition software.  Read the chart carefully and recognize, using context, where substitutions have occurred or contact the dictating provider.  "

## 2024-08-27 NOTE — ASSESSMENT & PLAN NOTE
Severe COPD with ratio 51%, FEV1 0.68 L / 36% (2022 PFT via [facility]). Gold 3D staged prior.     Symptoms improved to the degree that she stopped Symbicort however wheezing developed and PCP asked her 10 days ago to resume Symbicort. On exam today she is clear.  Continue Symbicort AM and PM - counseled patient that this is a maintenance medication.  Albuterol HFA up to 4x per day if needed or  Albuterol via nebulizer up to 4x per day if needed  I did review how to use Symbicort inhaler as well as use the nebulizer; patient was encouraged to return for demonstration if she encounters any issues

## 2024-08-27 NOTE — ASSESSMENT & PLAN NOTE
Severe COPD with ratio 51%, FEV1 0.68 L / 36% (2022 PFT via [facility]). Gold 3D staged prior. Overall symptoms have remained controlled since starting Symbicort and she is not having frequent exacerbation.  Continue Symbicort AM and PM  Albuterol HFA up to 4x per day if needed or  Albuterol via nebulizer up to 4x per day if needed

## 2024-11-04 ENCOUNTER — HOSPITAL ENCOUNTER (OUTPATIENT)
Dept: RADIOLOGY | Facility: CLINIC | Age: 89
Discharge: HOME | End: 2024-11-04

## 2024-11-04 DIAGNOSIS — Z12.31 ENCOUNTER FOR SCREENING MAMMOGRAM FOR BREAST CANCER: ICD-10-CM

## 2024-11-08 ENCOUNTER — RA CDI HCC (OUTPATIENT)
Dept: OTHER | Facility: HOSPITAL | Age: 89
End: 2024-11-08

## 2025-02-05 ENCOUNTER — OFFICE VISIT (OUTPATIENT)
Dept: FAMILY MEDICINE CLINIC | Facility: CLINIC | Age: 89
End: 2025-02-05
Payer: MEDICARE

## 2025-02-05 VITALS
BODY MASS INDEX: 21.33 KG/M2 | WEIGHT: 128 LBS | HEIGHT: 65 IN | DIASTOLIC BLOOD PRESSURE: 76 MMHG | SYSTOLIC BLOOD PRESSURE: 118 MMHG

## 2025-02-05 DIAGNOSIS — R60.0 LOCALIZED EDEMA: ICD-10-CM

## 2025-02-05 DIAGNOSIS — E03.9 ACQUIRED HYPOTHYROIDISM: Primary | ICD-10-CM

## 2025-02-05 DIAGNOSIS — I10 PRIMARY HYPERTENSION: ICD-10-CM

## 2025-02-05 DIAGNOSIS — J42 CHRONIC BRONCHITIS, UNSPECIFIED CHRONIC BRONCHITIS TYPE (HCC): ICD-10-CM

## 2025-02-05 PROCEDURE — 99214 OFFICE O/P EST MOD 30 MIN: CPT | Performed by: FAMILY MEDICINE

## 2025-02-05 PROCEDURE — G2211 COMPLEX E/M VISIT ADD ON: HCPCS | Performed by: FAMILY MEDICINE

## 2025-02-05 NOTE — PROGRESS NOTES
Name: Lo House      : 1935      MRN: [id number]  Encounter Provider: Matt Overton MD  Encounter Date: 2025   Encounter department: [facility]    Assessment & Plan  Acquired hypothyroidism  Overall stable, continue current regimen       Chronic bronchitis, unspecified chronic bronchitis type (HCC)  Seems stable.  He is using her aerosol on a regular basis.  Continue follow-up with pulmonology and should avoid smoking       Localized edema  Mildly symptomatic.  Patient does not take the Lasix for this.  Watch salt in diet and elevate legs when sitting       Primary hypertension  Stable, continue current regimen         Tobacco Cessation Counseling: Tobacco cessation counseling was not provided. The patient is sincerely urged to quit consumption of tobacco. She is not ready to quit tobacco. Medication options not discussed.         History of Present Illness       Patient has history of hypertension, hypothyroidism, edema in the left lower leg, COPD and does follow-up with pulmonology.  Has been using her aerosol on a regular basis      Review of Systems   Constitutional:  Negative for activity change and fever.   HENT:  Negative for congestion and trouble swallowing.    Respiratory:  Positive for cough (Occasional) and shortness of breath. Negative for chest tightness.    Cardiovascular:  Positive for leg swelling.   Gastrointestinal:  Negative for abdominal pain, constipation and diarrhea.   Endocrine: Positive for polyuria (Nocturia x 2-3).   Genitourinary:  Positive for enuresis.   Musculoskeletal:  Positive for arthralgias.   Allergic/Immunologic: Negative for environmental allergies.   Neurological:  Negative for dizziness and light-headedness.   Psychiatric/Behavioral:  Negative for agitation and sleep disturbance.      Past Medical History:   Diagnosis Date    Cataract     COPD (chronic obstructive pulmonary disease) (HCC)     Hypertension       Past Surgical History:   Procedure Laterality Date    HYSTERECTOMY  06/1972    OOPHORECTOMY Left 06/1972     Family History   Problem Relation Age of Onset    Colon cancer Mother     No Known Problems Father     No Known Problems Sister     No Known Problems Maternal Grandmother     No Known Problems Maternal Grandfather     Breast cancer Paternal Grandmother         unknown age    No Known Problems Paternal Grandfather     No Known Problems Son     Breast cancer Maternal Aunt 60    No Known Problems Maternal Aunt     Breast cancer Paternal Aunt         unknown age    No Known Problems Paternal Aunt     No Known Problems Paternal Aunt     No Known Problems Paternal Aunt     BRCA2 Positive Neg Hx     BRCA2 Negative Neg Hx     BRCA1 Positive Neg Hx     BRCA1 Negative Neg Hx     BRCA 1/2 Neg Hx     Ovarian cancer Neg Hx     Endometrial cancer Neg Hx     Breast cancer additional onset Neg Hx      Social History     Tobacco Use    Smoking status: Some Days     Types: Cigarettes    Smokeless tobacco: Never   Vaping Use    Vaping status: Never Used   Substance and Sexual Activity    Alcohol use: Not Currently     Comment: very rare    Drug use: Not Currently    Sexual activity: Not on file     Current Outpatient Medications on File Prior to Visit   Medication Sig    albuterol (2.5 mg/3 mL) 0.083 % nebulizer solution Take 3 mL (2.5 mg total) by nebulization every 6 (six) hours as needed for wheezing or shortness of breath    budesonide-formoterol (SYMBICORT) 160-4.5 mcg/act inhaler Inhale 2 puffs 2 (two) times a day Rinse mouth after use.    Calcium Carbonate-Vit D-Min (Calcium 1200) 0155-1031 MG-UNIT CHEW Chew 1 tablet daily    latanoprost (XALATAN) 0.005 % ophthalmic solution place 1 drop into both eyes every evening    metoprolol succinate (TOPROL-XL) 50 mg 24 hr tablet Take 1 tablet (50 mg total) by mouth every morning    sodium chloride 3 % inhalation solution INHALE CONTENTS OF 1 VIAL (4 ML) BY NEBULIZATION twice  "a day    Synthroid 100 MCG tablet Take 1 tablet (100 mcg total) by mouth daily    albuterol (PROVENTIL HFA,VENTOLIN HFA) 90 mcg/act inhaler inhale 2 puffs by mouth and INTO THE LUNGS every 4 hours (Patient not taking: Reported on 8/27/2024)    furosemide (LASIX) 20 mg tablet Take 1 tablet (20 mg total) by mouth daily as needed (If noticing swelling in the lower legs and feet) (Patient not taking: Reported on 8/27/2024)    potassium chloride (Klor-Con M10) 10 mEq tablet Take 1 tablet (10 mEq total) by mouth 2 (two) times a day Only take on the days that is taking the furosemide (Patient not taking: Reported on 8/27/2024)     No Known Allergies    There is no immunization history on file for this patient.  Objective   Blood Pressure 118/76 (BP Location: Right arm, Patient Position: Sitting, Cuff Size: Standard)   Height 5' 5\" (1.651 m)   Weight 58.1 kg (128 lb)   Body Mass Index 21.30 kg/m²     Physical Exam  Vitals and nursing note reviewed.   Constitutional:       General: She is not in acute distress.     Appearance: Normal appearance. She is well-developed.   HENT:      Head: Normocephalic and atraumatic.      Nose: Nose normal.      Mouth/Throat:      Mouth: Mucous membranes are moist.   Eyes:      Conjunctiva/sclera: Conjunctivae normal.   Neck:      Vascular: No carotid bruit.   Cardiovascular:      Rate and Rhythm: Normal rate and regular rhythm.      Heart sounds: No murmur (Rate is 72) heard.  Pulmonary:      Effort: Pulmonary effort is normal. No respiratory distress.      Breath sounds: Wheezing (Trace wheezing in all fields) present.   Abdominal:      General: Abdomen is flat.      Palpations: Abdomen is soft. There is no mass.      Tenderness: There is no abdominal tenderness.   Musculoskeletal:         General: No swelling.      Cervical back: Neck supple. No tenderness.      Right lower leg: No edema.      Left lower leg: Edema (1+ edema) present.   Lymphadenopathy:      Cervical: No cervical " adenopathy.   Skin:     General: Skin is warm and dry.      Capillary Refill: Capillary refill takes less than 2 seconds.      Findings: No rash.   Neurological:      Mental Status: She is alert.      Motor: No weakness.      Coordination: Coordination normal.      Gait: Gait normal.      Deep Tendon Reflexes: Reflexes abnormal (Reflexes 1+).   Psychiatric:         Mood and Affect: Mood normal.

## 2025-02-05 NOTE — PATIENT INSTRUCTIONS
Overall seems to be doing better.  Continue to push activity as tolerated and is following up with pulmonology.  Will continue with current meds

## 2025-02-06 NOTE — ASSESSMENT & PLAN NOTE
Seems stable.  He is using her aerosol on a regular basis.  Continue follow-up with pulmonology and should avoid smoking

## 2025-02-06 NOTE — ASSESSMENT & PLAN NOTE
Mildly symptomatic.  Patient does not take the Lasix for this.  Watch salt in diet and elevate legs when sitting

## 2025-03-06 PROBLEM — F17.210 NICOTINE DEPENDENCE, CIGARETTES, UNCOMPLICATED: Status: ACTIVE | Noted: 2025-03-06

## 2025-03-27 ENCOUNTER — OFFICE VISIT (OUTPATIENT)
Dept: PULMONOLOGY | Facility: CLINIC | Age: 89
End: 2025-03-27
Payer: MEDICARE

## 2025-03-27 VITALS
DIASTOLIC BLOOD PRESSURE: 80 MMHG | BODY MASS INDEX: 21.33 KG/M2 | HEART RATE: 68 BPM | HEIGHT: 65 IN | WEIGHT: 128 LBS | TEMPERATURE: 97.4 F | SYSTOLIC BLOOD PRESSURE: 140 MMHG | OXYGEN SATURATION: 90 %

## 2025-03-27 DIAGNOSIS — J44.9 COPD, SEVERE (HCC): Primary | ICD-10-CM

## 2025-03-27 DIAGNOSIS — F17.210 NICOTINE DEPENDENCE, CIGARETTES, UNCOMPLICATED: ICD-10-CM

## 2025-03-27 PROCEDURE — 99213 OFFICE O/P EST LOW 20 MIN: CPT | Performed by: NURSE PRACTITIONER

## 2025-03-27 PROCEDURE — G2211 COMPLEX E/M VISIT ADD ON: HCPCS | Performed by: NURSE PRACTITIONER

## 2025-03-27 RX ORDER — BUDESONIDE, GLYCOPYRROLATE, AND FORMOTEROL FUMARATE 160; 9; 4.8 UG/1; UG/1; UG/1
2 AEROSOL, METERED RESPIRATORY (INHALATION) 2 TIMES DAILY
Qty: 10.7 G | Refills: 5 | Status: SHIPPED | COMMUNITY
Start: 2025-03-27

## 2025-03-27 NOTE — PROGRESS NOTES
Follow-up  Visit - Pulmonary Medicine   Name: Lo House      : 1935      MRN: [id number]  Encounter Provider: DONAVAN Morelos  Encounter Date: 3/27/2025   Encounter department: [facility] [place]  :  Assessment & Plan  COPD, severe (HCC)  Gold stage IIId last FEV1 of 36% in , chronic bronchitis phenotype. Nebulizer treatments making her feel like she is choking on sinus drainage. I have asked her to step up inhaler to Breztri to see if she can eliminate some of the nebulizer treatments.  Start Breztri 2 puffs AM and PM; I have given 2 weeks of sample and she will call if the medication is beneficial.  Albuterol HFA up to 4x per day if needed  Albuterol via nebulizer up to 4x per day if needed for wheezing, cough, SOB  Add Hypertonic saline nebs if needed to help liquefy / mobilize secretions     Orders:    Budeson-Glycopyrrol-Formoterol (Breztri Aerosphere) 160-9-4.8 MCG/ACT AERO; Inhale 2 puffs 2 (two) times a day Rinse mouth after use.    Nicotine dependence, cigarettes, uncomplicated  She is a current smoker, about 4-5 cigarettes per day. Remains pre-contemplation.  Now out of the window for lung cancer screening CT at age 89  No overt symptoms of cancer at this time - denies unexpected weight loss, fatigue, worsening pulmonary symptoms           Return in about 3 months (around 2025).    History of Present Illness   Lo House is a 89 y.o. female who presents returning for follow up. She still has days with occasional wheezing but none currently. She notes that pain can make her breathless and she is dealing with her shoulder pain. No current cough or SOB. She is utilizing nebulizer, it does make her sinuses run but she is doing well overall.    Smoking: still smoking 2-5 cig per day.    Review of Systems  Please note that a 14-point review of systems was performed to include Constitutional, HEENT, Respiratory, CVS, GI, , Musculoskeletal,  Integumentary, Neurologic, Rheumatologic, Endocrinologic, Psychiatric, Lymphatic, and Hematologic/Oncologic systems were reviewed and are negative unless otherwise stated in HPI. Positive and negative findings pertinent to this evaluation are incorporated into the history of present illness.       Current Outpatient Medications on File Prior to Visit   Medication Sig Dispense Refill    albuterol (2.5 mg/3 mL) 0.083 % nebulizer solution Take 3 mL (2.5 mg total) by nebulization every 6 (six) hours as needed for wheezing or shortness of breath 90 mL 1    budesonide-formoterol (SYMBICORT) 160-4.5 mcg/act inhaler Inhale 2 puffs 2 (two) times a day Rinse mouth after use. 10.2 g 5    Calcium Carbonate-Vit D-Min (Calcium 1200) 7779-6174 MG-UNIT CHEW Chew 1 tablet daily      latanoprost (XALATAN) 0.005 % ophthalmic solution place 1 drop into both eyes every evening      metoprolol succinate (TOPROL-XL) 50 mg 24 hr tablet Take 1 tablet (50 mg total) by mouth every morning 30 tablet 5    sodium chloride 3 % inhalation solution INHALE CONTENTS OF 1 VIAL (4 ML) BY NEBULIZATION twice a day      Synthroid 100 MCG tablet Take 1 tablet (100 mcg total) by mouth daily 90 tablet 3    albuterol (PROVENTIL HFA,VENTOLIN HFA) 90 mcg/act inhaler inhale 2 puffs by mouth and INTO THE LUNGS every 4 hours (Patient not taking: Reported on 8/27/2024)      furosemide (LASIX) 20 mg tablet Take 1 tablet (20 mg total) by mouth daily as needed (If noticing swelling in the lower legs and feet) (Patient not taking: Reported on 3/27/2025) 30 tablet 5    potassium chloride (Klor-Con M10) 10 mEq tablet Take 1 tablet (10 mEq total) by mouth 2 (two) times a day Only take on the days that is taking the furosemide (Patient not taking: Reported on 3/27/2025) 30 tablet 5     No current facility-administered medications on file prior to visit.      Social History     Tobacco Use    Smoking status: Some Days     Types: Cigarettes    Smokeless tobacco: Never  "  Vaping Use    Vaping status: Never Used   Substance and Sexual Activity    Alcohol use: Not Currently     Comment: very rare    Drug use: Not Currently    Sexual activity: Not on file        Medical History Reviewed by provider this encounter:     .    Objective   /80 (BP Location: Left arm, Patient Position: Sitting, Cuff Size: Standard)   Pulse 68   Temp (!) 97.4 °F (36.3 °C) (Temporal)   Ht 5' 5\" (1.651 m)   Wt 58.1 kg (128 lb)   SpO2 90%   BMI 21.30 kg/m²     Physical Exam  Vitals reviewed.   Constitutional:       Appearance: Normal appearance.   HENT:      Head: Normocephalic.      Nose: Nose normal.      Mouth/Throat:      Mouth: Mucous membranes are moist.      Pharynx: Oropharynx is clear.   Cardiovascular:      Rate and Rhythm: Normal rate. Rhythm irregular.      Pulses: Normal pulses.      Heart sounds: Normal heart sounds.   Pulmonary:      Effort: Pulmonary effort is normal.      Breath sounds: Normal breath sounds.   Musculoskeletal:         General: Normal range of motion.   Skin:     General: Skin is warm.      Capillary Refill: Capillary refill takes less than 2 seconds.   Neurological:      General: No focal deficit present.      Mental Status: She is alert and oriented to person, place, and time.   Psychiatric:         Mood and Affect: Mood normal.         Behavior: Behavior normal.         Diagnostic Data:      DONAVAN Morelos      "

## 2025-03-27 NOTE — ASSESSMENT & PLAN NOTE
Gold stage IIId last FEV1 of 36% in 2022, chronic bronchitis phenotype. Nebulizer treatments making her feel like she is choking on sinus drainage. I have asked her to step up inhaler to Breztri to see if she can eliminate some of the nebulizer treatments.  Start Breztri 2 puffs AM and PM; I have given 2 weeks of sample and she will call if the medication is beneficial.  Albuterol HFA up to 4x per day if needed  Albuterol via nebulizer up to 4x per day if needed for wheezing, cough, SOB  Add Hypertonic saline nebs if needed to help liquefy / mobilize secretions     Orders:    Budeson-Glycopyrrol-Formoterol (Breztri Aerosphere) 160-9-4.8 MCG/ACT AERO; Inhale 2 puffs 2 (two) times a day Rinse mouth after use.

## 2025-03-27 NOTE — ASSESSMENT & PLAN NOTE
She is a current smoker, about 4-5 cigarettes per day. Remains pre-contemplation.  Now out of the window for lung cancer screening CT at age 89  No overt symptoms of cancer at this time - denies unexpected weight loss, fatigue, worsening pulmonary symptoms

## 2025-03-28 ENCOUNTER — TELEPHONE (OUTPATIENT)
Age: 89
End: 2025-03-28

## 2025-03-28 NOTE — TELEPHONE ENCOUNTER
Patient called stating she's been experiencing shoulder/neck pain and is asking for a referral for Ortho. Patient states she mentioned this at her last visit to Dr Overton but the pain has increased and is now looking for a referral . Please advise and return patient call .

## 2025-04-02 ENCOUNTER — OFFICE VISIT (OUTPATIENT)
Dept: FAMILY MEDICINE CLINIC | Facility: CLINIC | Age: 89
End: 2025-04-02
Payer: MEDICARE

## 2025-04-02 VITALS
OXYGEN SATURATION: 95 % | HEART RATE: 87 BPM | HEIGHT: 65 IN | SYSTOLIC BLOOD PRESSURE: 126 MMHG | DIASTOLIC BLOOD PRESSURE: 80 MMHG | BODY MASS INDEX: 21.69 KG/M2 | WEIGHT: 130.2 LBS

## 2025-04-02 DIAGNOSIS — M25.511 ACUTE PAIN OF RIGHT SHOULDER: Primary | ICD-10-CM

## 2025-04-02 PROCEDURE — 99213 OFFICE O/P EST LOW 20 MIN: CPT | Performed by: FAMILY MEDICINE

## 2025-04-02 PROCEDURE — G2211 COMPLEX E/M VISIT ADD ON: HCPCS | Performed by: FAMILY MEDICINE

## 2025-04-02 NOTE — ASSESSMENT & PLAN NOTE
Discussed problem.  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  Has mild irritation in the anterior and rotator cuff areas.  Will make referral for orthopedics.  May continue the Tylenol.  Try to keep the range of motion with the right shoulder and may do this by lifting it up with the left arm  Orders:    Ambulatory Referral to Orthopedic Surgery; Future

## 2025-04-02 NOTE — PATIENT INSTRUCTIONS
Discussed the problem with the right shoulder.  I do feel has degenerative arthritis and some irritation of the anterior and posterior rotator cuffs but also appears to have bursitis.  Will make referral to orthopedics for this and may continue the Tylenol.  Try to maintain range of motion of the shoulder

## 2025-04-02 NOTE — PROGRESS NOTES
Name: Lo House      : 1935      MRN: [id number]  Encounter Provider: Matt Overton MD  Encounter Date: 2025   Encounter department: [facility] PRIMARY CARE    Assessment & Plan  Acute pain of right shoulder  Discussed problem.  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  Has mild irritation in the anterior and rotator cuff areas.  Will make referral for orthopedics.  May continue the Tylenol.  Try to keep the range of motion with the right shoulder and may do this by lifting it up with the left arm  Orders:    Ambulatory Referral to Orthopedic Surgery; Future      Depression Screening and Follow-up Plan: Patient was screened for depression during today's encounter. They screened negative with a PHQ-2 score of 0.          History of Present Illness           Patient has history of hypertension, hypothyroidism, edema in the left lower leg, COPD and does follow-up with pulmonology.  Has been having trouble with right shoulder pain over the last 3 weeks.  Did not notice any injury..  It is very bothersome with activity and painful.  Does take Tylenol for this which perhaps helped some      Review of Systems   HENT:  Positive for congestion.    Respiratory:  Positive for cough and shortness of breath (This is at baseline). Negative for chest tightness.    Cardiovascular:  Negative for chest pain.   Gastrointestinal:  Negative for abdominal pain.   Musculoskeletal:  Positive for arthralgias (Right shoulder pain) and neck pain.   Neurological:  Negative for weakness.   Psychiatric/Behavioral:  Positive for sleep disturbance.      Past Medical History:   Diagnosis Date    Cataract     COPD (chronic obstructive pulmonary disease) (HCC)     Hypertension      Past Surgical History:   Procedure Laterality Date    HYSTERECTOMY  1972    OOPHORECTOMY Left 1972     Family History   Problem Relation Age of Onset    Colon cancer Mother      No Known Problems Father     No Known Problems Sister     No Known Problems Maternal Grandmother     No Known Problems Maternal Grandfather     Breast cancer Paternal Grandmother         unknown age    No Known Problems Paternal Grandfather     No Known Problems Son     Breast cancer Maternal Aunt 60    No Known Problems Maternal Aunt     Breast cancer Paternal Aunt         unknown age    No Known Problems Paternal Aunt     No Known Problems Paternal Aunt     No Known Problems Paternal Aunt     BRCA2 Positive Neg Hx     BRCA2 Negative Neg Hx     BRCA1 Positive Neg Hx     BRCA1 Negative Neg Hx     BRCA 1/2 Neg Hx     Ovarian cancer Neg Hx     Endometrial cancer Neg Hx     Breast cancer additional onset Neg Hx      Social History     Tobacco Use    Smoking status: Some Days     Types: Cigarettes    Smokeless tobacco: Never   Vaping Use    Vaping status: Never Used   Substance and Sexual Activity    Alcohol use: Not Currently     Comment: very rare    Drug use: Not Currently    Sexual activity: Not on file     Current Outpatient Medications on File Prior to Visit   Medication Sig    albuterol (2.5 mg/3 mL) 0.083 % nebulizer solution Take 3 mL (2.5 mg total) by nebulization every 6 (six) hours as needed for wheezing or shortness of breath    Budeson-Glycopyrrol-Formoterol (Breztri Aerosphere) 160-9-4.8 MCG/ACT AERO Inhale 2 puffs 2 (two) times a day Rinse mouth after use.    budesonide-formoterol (SYMBICORT) 160-4.5 mcg/act inhaler Inhale 2 puffs 2 (two) times a day Rinse mouth after use.    Calcium Carbonate-Vit D-Min (Calcium 1200) 4349-7776 MG-UNIT CHEW Chew 1 tablet daily    latanoprost (XALATAN) 0.005 % ophthalmic solution place 1 drop into both eyes every evening    metoprolol succinate (TOPROL-XL) 50 mg 24 hr tablet Take 1 tablet (50 mg total) by mouth every morning    sodium chloride 3 % inhalation solution INHALE CONTENTS OF 1 VIAL (4 ML) BY NEBULIZATION twice a day    Synthroid 100 MCG tablet Take 1 tablet  "(100 mcg total) by mouth daily    albuterol (PROVENTIL HFA,VENTOLIN HFA) 90 mcg/act inhaler inhale 2 puffs by mouth and INTO THE LUNGS every 4 hours (Patient not taking: Reported on 8/27/2024)    furosemide (LASIX) 20 mg tablet Take 1 tablet (20 mg total) by mouth daily as needed (If noticing swelling in the lower legs and feet) (Patient not taking: Reported on 8/27/2024)    potassium chloride (Klor-Con M10) 10 mEq tablet Take 1 tablet (10 mEq total) by mouth 2 (two) times a day Only take on the days that is taking the furosemide (Patient not taking: Reported on 8/27/2024)     No Known Allergies    There is no immunization history on file for this patient.  Objective   Blood Pressure 126/80 (BP Location: Left arm, Patient Position: Sitting, Cuff Size: Standard)   Pulse 87   Height 5' 5\" (1.651 m)   Weight 59.1 kg (130 lb 3.2 oz)   Oxygen Saturation 95%   Body Mass Index 21.67 kg/m²     Physical Exam  Vitals and nursing note reviewed.   Constitutional:       General: She is not in acute distress.     Appearance: She is well-developed.   HENT:      Head: Normocephalic and atraumatic.   Eyes:      Conjunctiva/sclera: Conjunctivae normal.   Cardiovascular:      Rate and Rhythm: Normal rate and regular rhythm.      Heart sounds: No murmur (Rate is 72) heard.  Pulmonary:      Effort: Pulmonary effort is normal. No respiratory distress.      Breath sounds: Normal breath sounds.   Abdominal:      Palpations: Abdomen is soft.      Tenderness: There is no abdominal tenderness.   Musculoskeletal:         General: No swelling.        Arms:       Cervical back: Neck supple. No tenderness.   Lymphadenopathy:      Cervical: No cervical adenopathy.   Skin:     General: Skin is warm and dry.      Capillary Refill: Capillary refill takes less than 2 seconds.      Findings: No rash.   Neurological:      Mental Status: She is alert.      Motor: Weakness (In the legs) present.      Coordination: Coordination normal.      Gait: Gait " abnormal.   Psychiatric:         Mood and Affect: Mood normal.

## 2025-04-15 ENCOUNTER — HOSPITAL ENCOUNTER (OUTPATIENT)
Dept: RADIOLOGY | Facility: CLINIC | Age: 89
Discharge: HOME | End: 2025-04-15
Attending: STUDENT IN AN ORGANIZED HEALTH CARE EDUCATION/TRAINING PROGRAM
Payer: MEDICARE

## 2025-04-15 ENCOUNTER — OFFICE VISIT (OUTPATIENT)
Dept: OBGYN CLINIC | Facility: CLINIC | Age: 89
End: 2025-04-15
Payer: MEDICARE

## 2025-04-15 VITALS — HEIGHT: 65 IN | WEIGHT: 127.6 LBS | BODY MASS INDEX: 21.26 KG/M2

## 2025-04-15 DIAGNOSIS — M25.511 ACUTE PAIN OF RIGHT SHOULDER: ICD-10-CM

## 2025-04-15 DIAGNOSIS — M25.511 CHRONIC RIGHT SHOULDER PAIN: ICD-10-CM

## 2025-04-15 DIAGNOSIS — M75.41 IMPINGEMENT SYNDROME OF RIGHT SHOULDER: Primary | ICD-10-CM

## 2025-04-15 DIAGNOSIS — G89.29 CHRONIC RIGHT SHOULDER PAIN: ICD-10-CM

## 2025-04-15 PROCEDURE — 99204 OFFICE O/P NEW MOD 45 MIN: CPT | Performed by: STUDENT IN AN ORGANIZED HEALTH CARE EDUCATION/TRAINING PROGRAM

## 2025-04-15 PROCEDURE — 73030 X-RAY EXAM OF SHOULDER: CPT

## 2025-04-15 NOTE — ASSESSMENT & PLAN NOTE
Lo House is a 89 y.o. year old female with right shoulder pain due to impingement.    We had a shared decision making discussion regarding their shoulder pain.  The etiology of the pain, physical exam findings, and imaging was reviewed with the patient.        We discussed the different treatment options, as well as the advantages and disadvantages of each.     Non operative management options such as oral anti-inflammatories, steroid injections, and physical therapy are effective in decreasing pain, improving function, and often are sufficient to improve their pain and restore function.  The risks of these non operative management options are low, and the benefit is the avoidance of surgery and improvement in pain and function.     We also discussed additional imaging to further evaluate the rotator cuff, such as ultrasound and MRI, as well as potential damage to surrounding structures including the biceps tendon  .    Finally, we discussed surgery as an option for people that do not improve sufficiently with non-operative management, this is often times dependent upon the results of advanced imaging and may include procedures such as arthroscopic debridement of the rotator cuff, possible arthroscopic treatment of any rotator cuff tears or specific treatments for the biceps tendon.  We reviewed the outcomes of surgery as well as the recovery process involved.     At this point, the patient would like to proceed with PT and voltaren.      Orders:    XR shoulder 2+ vw right; Future    Diclofenac Sodium (VOLTAREN) 1 %; Apply 2 g topically 4 (four) times a day    Ambulatory Referral to Physical Therapy; Future

## 2025-04-15 NOTE — PROGRESS NOTES
Assessment & Plan  Impingement syndrome of right shoulder  Lo House is a 89 y.o. year old female with right shoulder pain due to impingement.    We had a shared decision making discussion regarding their shoulder pain.  The etiology of the pain, physical exam findings, and imaging was reviewed with the patient.        We discussed the different treatment options, as well as the advantages and disadvantages of each.     Non operative management options such as oral anti-inflammatories, steroid injections, and physical therapy are effective in decreasing pain, improving function, and often are sufficient to improve their pain and restore function.  The risks of these non operative management options are low, and the benefit is the avoidance of surgery and improvement in pain and function.     We also discussed additional imaging to further evaluate the rotator cuff, such as ultrasound and MRI, as well as potential damage to surrounding structures including the biceps tendon  .    Finally, we discussed surgery as an option for people that do not improve sufficiently with non-operative management, this is often times dependent upon the results of advanced imaging and may include procedures such as arthroscopic debridement of the rotator cuff, possible arthroscopic treatment of any rotator cuff tears or specific treatments for the biceps tendon.  We reviewed the outcomes of surgery as well as the recovery process involved.     At this point, the patient would like to proceed with PT and voltaren.      Orders:    XR shoulder 2+ vw right; Future    Diclofenac Sodium (VOLTAREN) 1 %; Apply 2 g topically 4 (four) times a day    Ambulatory Referral to Physical Therapy; Future    Acute pain of right shoulder    Orders:    Ambulatory Referral to Orthopedic Surgery             General  Chief Complaint   Patient presents with    Right Shoulder - Pain        Subjective    Lo House is a right hand dominant 89 y.o. female  who presents with right shoulder pain     History of Present Illness   The patient complains of right shoulder pain. The pain started 3 months ago without injury. At that time the patient describes worsening pain and stiffness.  She does have a history of rotator cuff tear treated nonoperatively with physical therapy.    The patient rates the pain as a 5/10. The pain is located Lateral and Posterior.The pain is described as Sharp and Sore. The patient has tried Tylenol for their problem.  The pain improves with rest. The pain worsens with overhead motion.    The patient does have pain at night. The patient does have pain with overhead activity, and does not describe weakness.     The pain does not go past the elbow. The patient does have neck pain.The shoulder does not feel unstable. The patient does not have numbness.    Ortho Sports Medicine Patient Answers  Failed to redirect to the Timeline version of the [employer] Smart[employer].  No Known Allergies  Outpatient Encounter Medications as of 4/15/2025   Medication Sig Dispense Refill    albuterol (2.5 mg/3 mL) 0.083 % nebulizer solution Take 3 mL (2.5 mg total) by nebulization every 6 (six) hours as needed for wheezing or shortness of breath 90 mL 1    Budeson-Glycopyrrol-Formoterol (Breztri Aerosphere) 160-9-4.8 MCG/ACT AERO Inhale 2 puffs 2 (two) times a day Rinse mouth after use. 10.7 g 5    budesonide-formoterol (SYMBICORT) 160-4.5 mcg/act inhaler Inhale 2 puffs 2 (two) times a day Rinse mouth after use. 10.2 g 5    Calcium Carbonate-Vit D-Min (Calcium 1200) 6526-1829 MG-UNIT CHEW Chew 1 tablet daily      Diclofenac Sodium (VOLTAREN) 1 % Apply 2 g topically 4 (four) times a day 240 g 0    latanoprost (XALATAN) 0.005 % ophthalmic solution place 1 drop into both eyes every evening      metoprolol succinate (TOPROL-XL) 50 mg 24 hr tablet Take 1 tablet (50 mg total) by mouth every morning 30 tablet 5    sodium chloride 3 % inhalation solution INHALE CONTENTS OF 1 VIAL (4  ML) BY NEBULIZATION twice a day      Synthroid 100 MCG tablet Take 1 tablet (100 mcg total) by mouth daily 90 tablet 3    albuterol (PROVENTIL HFA,VENTOLIN HFA) 90 mcg/act inhaler inhale 2 puffs by mouth and INTO THE LUNGS every 4 hours (Patient not taking: Reported on 8/27/2024)      furosemide (LASIX) 20 mg tablet Take 1 tablet (20 mg total) by mouth daily as needed (If noticing swelling in the lower legs and feet) (Patient not taking: Reported on 8/27/2024) 30 tablet 5    potassium chloride (Klor-Con M10) 10 mEq tablet Take 1 tablet (10 mEq total) by mouth 2 (two) times a day Only take on the days that is taking the furosemide (Patient not taking: Reported on 8/27/2024) 30 tablet 5     No facility-administered encounter medications on file as of 4/15/2025.     Past Medical History:   Diagnosis Date    Cataract     COPD (chronic obstructive pulmonary disease) (HCC)     Hypertension      Past Surgical History:   Procedure Laterality Date    HYSTERECTOMY  06/1972    OOPHORECTOMY Left 06/1972     Family History   Problem Relation Age of Onset    Colon cancer Mother     No Known Problems Father     No Known Problems Sister     No Known Problems Maternal Grandmother     No Known Problems Maternal Grandfather     Breast cancer Paternal Grandmother         unknown age    No Known Problems Paternal Grandfather     No Known Problems Son     Breast cancer Maternal Aunt 60    No Known Problems Maternal Aunt     Breast cancer Paternal Aunt         unknown age    No Known Problems Paternal Aunt     No Known Problems Paternal Aunt     No Known Problems Paternal Aunt     BRCA2 Positive Neg Hx     BRCA2 Negative Neg Hx     BRCA1 Positive Neg Hx     BRCA1 Negative Neg Hx     BRCA 1/2 Neg Hx     Ovarian cancer Neg Hx     Endometrial cancer Neg Hx     Breast cancer additional onset Neg Hx      Social History     Tobacco Use    Smoking status: Some Days     Types: Cigarettes    Smokeless tobacco: Never   Vaping Use    Vaping status:  Never Used   Substance Use Topics    Alcohol use: Not Currently     Comment: very rare    Drug use: Not Currently           Objective    There were no vitals filed for this visit.  Body mass index is 21.23 kg/m².  Physical Exam    Shoulder Exam    Affected shoulder:   right    Skin: Without ecchymosis, wounds or prior incisions    Tenderness:  Bicipital Groove    Range of Motion (active/passive):  Side Active (FE/ER/IR) Passive (FE/ER/IR)   right 100/15/sacrum 130/20   left 160/20/lumbar 160/20       Rotator Cuff Strength:  Side Supraspinatus Infraspinatus/Teres Subscapularis   right 4/5 4/5 5/5   left 5/5 5/5 5/5       Impingement and Rotator Cuff Exam:  Neer's test for impingement Positive   Ballard' test for impingement Positive   German's test Pain and mild weakness   Belly Press Negative     Biceps Exam:  Nemaha's test for SLAP tear: Deferred   Jergeson's test for biciptal pain: Positive   Speeds test for biciptal pain: Positive       Shoulder Instability Exam:  The apprehension test for anterior instability: Deferred   The relocation test: Deferred   The posterior load and shift test: Deferred         Cervical Spine Exam:  Tenderness: None  ROM: WNL  Spurlings: Negative    Distally the patient's neurovascular status is normal.      Review of Prior Testing:    I independently interpreted the following test:     right shoulder x-ray images done on 04/15/25:  Multiple views show mild degenerative changes, no fracture or dislocation, diffuse osteopenia           Follow Up: Return in about 4 months (around 8/15/2025).    All questions answered and patient agrees with plan.

## 2025-04-16 ENCOUNTER — TELEPHONE (OUTPATIENT)
Age: 89
End: 2025-04-16

## 2025-04-16 NOTE — TELEPHONE ENCOUNTER
Pt requesting to speak to Laura in regards to shoulder pain she mentioned before. Another provider is suggesting PT but she is wondering if this pain can be coming from nodule in her R lung. Please advise

## 2025-04-17 NOTE — TELEPHONE ENCOUNTER
Discussed with patient the pulmonary nodule is not responsible for the musculoskeletal pain in the shoulder. I do favor PT and she will contact other provider for that plan. Her nodule is 3mm and felt to be benign.

## 2025-05-19 DIAGNOSIS — J43.2 CENTRILOBULAR EMPHYSEMA (HCC): ICD-10-CM

## 2025-05-20 ENCOUNTER — TELEPHONE (OUTPATIENT)
Dept: PULMONOLOGY | Facility: CLINIC | Age: 89
End: 2025-05-20

## 2025-05-20 DIAGNOSIS — J44.9 COPD, SEVERE (HCC): ICD-10-CM

## 2025-05-20 RX ORDER — BUDESONIDE, GLYCOPYRROLATE, AND FORMOTEROL FUMARATE 160; 9; 4.8 UG/1; UG/1; UG/1
2 AEROSOL, METERED RESPIRATORY (INHALATION) 2 TIMES DAILY
Qty: 10.7 G | Refills: 5 | Status: SHIPPED | OUTPATIENT
Start: 2025-05-20

## 2025-05-20 RX ORDER — BUDESONIDE AND FORMOTEROL FUMARATE DIHYDRATE 160; 4.5 UG/1; UG/1
AEROSOL RESPIRATORY (INHALATION)
Refills: 5 | OUTPATIENT
Start: 2025-05-20

## 2025-05-20 NOTE — TELEPHONE ENCOUNTER
Reason for call:   [x] Prior Auth  [] Other:     Caller:  [] Patient  [x] Pharmacy  Name: [facility]  Address: [address] [place] [address]   Callback Number: [phone]     Medication: budesonide-formoterol (SYMBICORT) 160-4.5 mcg/act inhaler     Dose/Frequency: Inhale 2 puffs 2 (two) times a day Rinse mouth after use.     Quantity: 10.2 g    Ordering Provider:   [] PCP/Provider -   [x] Speciality/Provider -

## 2025-05-22 ENCOUNTER — RA CDI HCC (OUTPATIENT)
Dept: OTHER | Facility: HOSPITAL | Age: 89
End: 2025-05-22

## 2025-05-23 NOTE — PROGRESS NOTES
HCC coding opportunities          Chart Reviewed number of suggestions sent to Provider: 1     Patients Insurance     Medicare Insurance: Medicare        J43.2

## 2025-05-31 DIAGNOSIS — I10 PRIMARY HYPERTENSION: ICD-10-CM

## 2025-06-01 RX ORDER — METOPROLOL SUCCINATE 50 MG/1
50 TABLET, EXTENDED RELEASE ORAL EVERY MORNING
Qty: 90 TABLET | Refills: 1 | Status: SHIPPED | OUTPATIENT
Start: 2025-06-01

## 2025-07-30 ENCOUNTER — APPOINTMENT (EMERGENCY)
Dept: RADIOLOGY | Facility: HOSPITAL | Age: 89
DRG: 189 | End: 2025-07-30
Payer: MEDICARE

## 2025-07-30 ENCOUNTER — NURSE TRIAGE (OUTPATIENT)
Age: 89
End: 2025-07-30

## 2025-07-30 ENCOUNTER — HOSPITAL ENCOUNTER (INPATIENT)
Facility: HOSPITAL | Age: 89
LOS: 10 days | Discharge: HOME HEALTH | DRG: 189 | End: 2025-08-09
Attending: EMERGENCY MEDICINE | Admitting: FAMILY MEDICINE
Payer: MEDICARE

## 2025-07-30 PROBLEM — J96.00 ACUTE RESPIRATORY FAILURE (HCC): Status: ACTIVE | Noted: 2025-07-30

## 2025-07-30 PROBLEM — R79.89 ELEVATED TROPONIN: Status: ACTIVE | Noted: 2025-07-30

## 2025-07-30 PROBLEM — R65.10 SIRS (SYSTEMIC INFLAMMATORY RESPONSE SYNDROME) (HCC): Status: ACTIVE | Noted: 2025-07-30

## 2025-07-30 PROBLEM — I16.0 HYPERTENSIVE URGENCY: Status: ACTIVE | Noted: 2025-07-30

## 2025-07-30 PROBLEM — J44.1 COPD WITH EXACERBATION (HCC): Status: ACTIVE | Noted: 2025-07-30

## 2025-07-31 ENCOUNTER — APPOINTMENT (INPATIENT)
Dept: NON INVASIVE DIAGNOSTICS | Facility: HOSPITAL | Age: 89
DRG: 189 | End: 2025-07-31
Payer: MEDICARE

## 2025-08-03 ENCOUNTER — APPOINTMENT (INPATIENT)
Dept: RADIOLOGY | Facility: HOSPITAL | Age: 89
DRG: 189 | End: 2025-08-03
Payer: MEDICARE

## 2025-08-03 ENCOUNTER — APPOINTMENT (INPATIENT)
Dept: CT IMAGING | Facility: HOSPITAL | Age: 89
DRG: 189 | End: 2025-08-03
Payer: MEDICARE

## 2025-08-05 PROBLEM — J18.9 PNEUMONIA: Status: ACTIVE | Noted: 2025-08-05

## 2025-08-06 ENCOUNTER — APPOINTMENT (INPATIENT)
Dept: RADIOLOGY | Facility: HOSPITAL | Age: 89
DRG: 189 | End: 2025-08-06
Payer: MEDICARE

## 2025-08-07 ENCOUNTER — TELEPHONE (OUTPATIENT)
Age: 89
End: 2025-08-07

## 2025-08-08 ENCOUNTER — TELEPHONE (OUTPATIENT)
Dept: PULMONOLOGY | Facility: CLINIC | Age: 89
End: 2025-08-08

## 2025-08-11 ENCOUNTER — TELEPHONE (OUTPATIENT)
Age: 89
End: 2025-08-11

## 2025-08-14 ENCOUNTER — OFFICE VISIT (OUTPATIENT)
Dept: PULMONOLOGY | Facility: CLINIC | Age: 89
End: 2025-08-14
Payer: MEDICARE

## 2025-08-14 PROBLEM — J96.11 CHRONIC RESPIRATORY FAILURE WITH HYPOXIA (HCC): Status: ACTIVE | Noted: 2025-07-30

## 2025-08-15 ENCOUNTER — PATIENT OUTREACH (OUTPATIENT)
Dept: CASE MANAGEMENT | Facility: OTHER | Age: 89
End: 2025-08-15

## 2025-08-15 ENCOUNTER — OFFICE VISIT (OUTPATIENT)
Age: 89
End: 2025-08-15
Payer: MEDICARE

## 2025-08-15 ENCOUNTER — TELEPHONE (OUTPATIENT)
Age: 89
End: 2025-08-15

## 2025-08-15 PROBLEM — R26.2 AMBULATORY DYSFUNCTION: Status: ACTIVE | Noted: 2025-08-15

## 2025-08-15 PROBLEM — Z00.00 MEDICARE ANNUAL WELLNESS VISIT, SUBSEQUENT: Status: ACTIVE | Noted: 2025-08-15

## 2025-08-15 PROBLEM — J96.01 ACUTE RESPIRATORY FAILURE WITH HYPOXIA (HCC): Status: ACTIVE | Noted: 2025-08-15

## 2025-08-15 PROBLEM — J96.00 ACUTE RESPIRATORY FAILURE (HCC): Status: ACTIVE | Noted: 2025-08-15

## 2025-08-18 ENCOUNTER — PATIENT OUTREACH (OUTPATIENT)
Dept: CASE MANAGEMENT | Facility: OTHER | Age: 89
End: 2025-08-18

## 2025-08-20 ENCOUNTER — PATIENT OUTREACH (OUTPATIENT)
Dept: CASE MANAGEMENT | Facility: OTHER | Age: 89
End: 2025-08-20